# Patient Record
Sex: MALE | Race: WHITE | NOT HISPANIC OR LATINO | URBAN - METROPOLITAN AREA
[De-identification: names, ages, dates, MRNs, and addresses within clinical notes are randomized per-mention and may not be internally consistent; named-entity substitution may affect disease eponyms.]

---

## 2018-09-24 ENCOUNTER — EMERGENCY (EMERGENCY)
Facility: HOSPITAL | Age: 28
LOS: 1 days | Discharge: HOME | End: 2018-09-24
Attending: EMERGENCY MEDICINE
Payer: SELF-PAY

## 2018-09-24 VITALS
SYSTOLIC BLOOD PRESSURE: 148 MMHG | RESPIRATION RATE: 18 BRPM | HEART RATE: 57 BPM | DIASTOLIC BLOOD PRESSURE: 77 MMHG | OXYGEN SATURATION: 97 % | TEMPERATURE: 99 F

## 2018-09-24 VITALS — HEIGHT: 74 IN | WEIGHT: 240.08 LBS

## 2018-09-24 PROCEDURE — 72100 X-RAY EXAM L-S SPINE 2/3 VWS: CPT | Mod: 26

## 2018-09-24 PROCEDURE — 99283 EMERGENCY DEPT VISIT LOW MDM: CPT

## 2018-09-24 PROCEDURE — 72100 X-RAY EXAM L-S SPINE 2/3 VWS: CPT

## 2018-09-24 RX ORDER — METHOCARBAMOL 500 MG/1
1500 TABLET, FILM COATED ORAL ONCE
Qty: 0 | Refills: 0 | Status: COMPLETED | OUTPATIENT
Start: 2018-09-24 | End: 2018-09-24

## 2018-09-24 RX ORDER — LIDOCAINE 4 G/100G
1 CREAM TOPICAL ONCE
Qty: 0 | Refills: 0 | Status: COMPLETED | OUTPATIENT
Start: 2018-09-24 | End: 2018-09-24

## 2018-09-24 RX ORDER — KETOROLAC TROMETHAMINE 30 MG/ML
60 SYRINGE (ML) INJECTION ONCE
Qty: 0 | Refills: 0 | Status: DISCONTINUED | OUTPATIENT
Start: 2018-09-24 | End: 2018-09-24

## 2018-09-24 RX ORDER — METHOCARBAMOL 500 MG/1
2 TABLET, FILM COATED ORAL
Qty: 30 | Refills: 0
Start: 2018-09-24 | End: 2018-09-28

## 2018-09-24 RX ORDER — IBUPROFEN 200 MG
1 TABLET ORAL
Qty: 20 | Refills: 0
Start: 2018-09-24 | End: 2018-09-28

## 2018-09-24 RX ADMIN — METHOCARBAMOL 1500 MILLIGRAM(S): 500 TABLET, FILM COATED ORAL at 09:43

## 2018-09-24 RX ADMIN — LIDOCAINE 1 PATCH: 4 CREAM TOPICAL at 09:42

## 2018-09-24 NOTE — ED STATDOCS - OBJECTIVE STATEMENT
This is a 28 year old male with c/o back pain x 4 days.  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  He notes pain upon movement.  He has been taking IBU 600mg with some relief.  He denies any numbness or tingly sensation.  He notes prior back pain that he never got evaluated for.  He denies any bowel or bladder dysfunction.

## 2018-09-24 NOTE — ED ADULT NURSE NOTE - NSIMPLEMENTINTERV_GEN_ALL_ED
Implemented All Universal Safety Interventions:  Pitcher to call system. Call bell, personal items and telephone within reach. Instruct patient to call for assistance. Room bathroom lighting operational. Non-slip footwear when patient is off stretcher. Physically safe environment: no spills, clutter or unnecessary equipment. Stretcher in lowest position, wheels locked, appropriate side rails in place.

## 2018-09-24 NOTE — ED STATDOCS - MEDICAL DECISION MAKING DETAILS
back pain s/p lifting heavy object at work neurologically intact likely MSK spasm: XR, NSAIDS, robaxin, lidoderm patch, f/u with spine, rest, heat

## 2020-08-03 ENCOUNTER — TRANSCRIPTION ENCOUNTER (OUTPATIENT)
Age: 30
End: 2020-08-03

## 2021-01-07 PROBLEM — Z00.00 ENCOUNTER FOR PREVENTIVE HEALTH EXAMINATION: Status: ACTIVE | Noted: 2021-01-07

## 2021-01-12 ENCOUNTER — APPOINTMENT (OUTPATIENT)
Dept: INTERNAL MEDICINE | Facility: CLINIC | Age: 31
End: 2021-01-12
Payer: COMMERCIAL

## 2021-01-12 VITALS
WEIGHT: 268.25 LBS | OXYGEN SATURATION: 96 % | DIASTOLIC BLOOD PRESSURE: 87 MMHG | SYSTOLIC BLOOD PRESSURE: 135 MMHG | BODY MASS INDEX: 34.43 KG/M2 | TEMPERATURE: 97.7 F | HEIGHT: 74 IN | HEART RATE: 78 BPM

## 2021-01-12 DIAGNOSIS — R73.9 HYPERGLYCEMIA, UNSPECIFIED: ICD-10-CM

## 2021-01-12 DIAGNOSIS — E78.9 DISORDER OF LIPOPROTEIN METABOLISM, UNSPECIFIED: ICD-10-CM

## 2021-01-12 DIAGNOSIS — E03.9 HYPOTHYROIDISM, UNSPECIFIED: ICD-10-CM

## 2021-01-12 PROCEDURE — 99072 ADDL SUPL MATRL&STAF TM PHE: CPT

## 2021-01-12 PROCEDURE — 36415 COLL VENOUS BLD VENIPUNCTURE: CPT

## 2021-01-12 PROCEDURE — 99385 PREV VISIT NEW AGE 18-39: CPT | Mod: 25

## 2021-01-12 NOTE — HEALTH RISK ASSESSMENT
[Good] : ~his/her~  mood as  good [] : No [Yes] : Yes [Monthly or less (1 pt)] : Monthly or less (1 point) [1 or 2 (0 pts)] : 1 or 2 (0 points) [Never (0 pts)] : Never (0 points) [No] : In the past 12 months have you used drugs other than those required for medical reasons? No [No falls in past year] : Patient reported no falls in the past year [0] : 2) Feeling down, depressed, or hopeless: Not at all (0) [Audit-CScore] : 1 [UPD5Incgs] : 0 [Patient declined Low Dose CT Scan] : Patient declined Low Dose CT Scan [Patient declined Retinal Exam] : Patient declined Retinal Exam. [HIV Test offered] : HIV Test offered [Hepatitis C test offered] : Hepatitis C test offered

## 2021-01-12 NOTE — HISTORY OF PRESENT ILLNESS
[FreeTextEntry1] : np est [de-identified] : Mr. DAVE NOLAN is a 31 year male comes to the office for physical exam. Patient denies fever, cough SOB. No other complaints at this time.

## 2021-01-13 LAB
25(OH)D3 SERPL-MCNC: 20.4 NG/ML
ALBUMIN SERPL ELPH-MCNC: 4.7 G/DL
ALP BLD-CCNC: 44 U/L
ALT SERPL-CCNC: 43 U/L
ANION GAP SERPL CALC-SCNC: 14 MMOL/L
AST SERPL-CCNC: 27 U/L
BASOPHILS # BLD AUTO: 0.05 K/UL
BASOPHILS NFR BLD AUTO: 0.8 %
BILIRUB SERPL-MCNC: 0.4 MG/DL
BUN SERPL-MCNC: 21 MG/DL
CALCIUM SERPL-MCNC: 9.6 MG/DL
CHLORIDE SERPL-SCNC: 99 MMOL/L
CHOLEST SERPL-MCNC: 277 MG/DL
CO2 SERPL-SCNC: 23 MMOL/L
CREAT SERPL-MCNC: 1.19 MG/DL
EOSINOPHIL # BLD AUTO: 0.19 K/UL
EOSINOPHIL NFR BLD AUTO: 3.1 %
ESTIMATED AVERAGE GLUCOSE: 105 MG/DL
GLUCOSE SERPL-MCNC: 79 MG/DL
HBA1C MFR BLD HPLC: 5.3 %
HCT VFR BLD CALC: 43.7 %
HCV AB SER QL: NONREACTIVE
HCV S/CO RATIO: 0.06 S/CO
HDLC SERPL-MCNC: 47 MG/DL
HGB BLD-MCNC: 14 G/DL
HIV1+2 AB SPEC QL IA.RAPID: NONREACTIVE
IMM GRANULOCYTES NFR BLD AUTO: 0.3 %
LDLC SERPL CALC-MCNC: 196 MG/DL
LYMPHOCYTES # BLD AUTO: 2.24 K/UL
LYMPHOCYTES NFR BLD AUTO: 36.6 %
MAN DIFF?: NORMAL
MCHC RBC-ENTMCNC: 30.4 PG
MCHC RBC-ENTMCNC: 32 GM/DL
MCV RBC AUTO: 94.8 FL
MONOCYTES # BLD AUTO: 0.63 K/UL
MONOCYTES NFR BLD AUTO: 10.3 %
NEUTROPHILS # BLD AUTO: 2.99 K/UL
NEUTROPHILS NFR BLD AUTO: 48.9 %
NONHDLC SERPL-MCNC: 230 MG/DL
PLATELET # BLD AUTO: 264 K/UL
POTASSIUM SERPL-SCNC: 3.9 MMOL/L
PROT SERPL-MCNC: 7.2 G/DL
PSA SERPL-MCNC: 0.68 NG/ML
RBC # BLD: 4.61 M/UL
RBC # FLD: 14.2 %
SODIUM SERPL-SCNC: 136 MMOL/L
TRIGL SERPL-MCNC: 167 MG/DL
TSH SERPL-ACNC: 1.04 UIU/ML
WBC # FLD AUTO: 6.12 K/UL

## 2021-01-18 LAB
TESTOST BND SERPL-MCNC: 3.9 PG/ML
TESTOST SERPL-MCNC: 149.3 NG/DL

## 2021-05-25 ENCOUNTER — APPOINTMENT (OUTPATIENT)
Dept: INTERNAL MEDICINE | Facility: CLINIC | Age: 31
End: 2021-05-25
Payer: COMMERCIAL

## 2021-05-25 VITALS
WEIGHT: 241 LBS | HEART RATE: 57 BPM | DIASTOLIC BLOOD PRESSURE: 88 MMHG | SYSTOLIC BLOOD PRESSURE: 133 MMHG | TEMPERATURE: 97.1 F | HEIGHT: 74 IN | BODY MASS INDEX: 30.93 KG/M2

## 2021-05-25 DIAGNOSIS — B36.0 PITYRIASIS VERSICOLOR: ICD-10-CM

## 2021-05-25 PROCEDURE — 99214 OFFICE O/P EST MOD 30 MIN: CPT | Mod: 25

## 2021-05-25 PROCEDURE — 36415 COLL VENOUS BLD VENIPUNCTURE: CPT

## 2021-05-25 PROCEDURE — 99072 ADDL SUPL MATRL&STAF TM PHE: CPT

## 2021-05-25 NOTE — PHYSICAL EXAM
[No Acute Distress] : no acute distress [Well Nourished] : well nourished [Well Developed] : well developed [Well-Appearing] : well-appearing [Normal Sclera/Conjunctiva] : normal sclera/conjunctiva [PERRL] : pupils equal round and reactive to light [EOMI] : extraocular movements intact [Normal Outer Ear/Nose] : the outer ears and nose were normal in appearance [Normal Oropharynx] : the oropharynx was normal [No JVD] : no jugular venous distention [No Lymphadenopathy] : no lymphadenopathy [Supple] : supple [Thyroid Normal, No Nodules] : the thyroid was normal and there were no nodules present [No Respiratory Distress] : no respiratory distress  [No Accessory Muscle Use] : no accessory muscle use [Clear to Auscultation] : lungs were clear to auscultation bilaterally [Normal Rate] : normal rate  [Regular Rhythm] : with a regular rhythm [Normal S1, S2] : normal S1 and S2 [No Murmur] : no murmur heard [No Carotid Bruits] : no carotid bruits [No Abdominal Bruit] : a ~M bruit was not heard ~T in the abdomen [No Varicosities] : no varicosities [Pedal Pulses Present] : the pedal pulses are present [No Edema] : there was no peripheral edema [No Palpable Aorta] : no palpable aorta [No Extremity Clubbing/Cyanosis] : no extremity clubbing/cyanosis [Soft] : abdomen soft [Non Tender] : non-tender [Non-distended] : non-distended [No Masses] : no abdominal mass palpated [No HSM] : no HSM [Normal Bowel Sounds] : normal bowel sounds [Normal Posterior Cervical Nodes] : no posterior cervical lymphadenopathy [Normal Anterior Cervical Nodes] : no anterior cervical lymphadenopathy [No CVA Tenderness] : no CVA  tenderness [No Spinal Tenderness] : no spinal tenderness [No Joint Swelling] : no joint swelling [Grossly Normal Strength/Tone] : grossly normal strength/tone [Coordination Grossly Intact] : coordination grossly intact [No Focal Deficits] : no focal deficits [Normal Gait] : normal gait [Deep Tendon Reflexes (DTR)] : deep tendon reflexes were 2+ and symmetric [Normal Affect] : the affect was normal [Normal Insight/Judgement] : insight and judgment were intact [de-identified] : scattered many erthmatous macular lesions on chest abdomen and back.

## 2021-05-25 NOTE — PLAN
[FreeTextEntry1] : Tinea versicolor- prescribed topical antifungal/topical steroid.\par \par Regarding patient's obesity\par - encourage lifestyle modifications\par - diet and exercise\par - reduce calorie intake\par - no soda/ junk food/ snacks\par - consider mixed grains/ whole grains, leafy vegetables, and an appropriate serving of protein \par \par Prior to appointment and during encounter with patient extensive medical records were reviewed including but not limited to, Hospital records records, out patient records, laboratory data and microbiology data \par In addition extensive time was also spent in reviewing diagnostic studies.\par \par Total encounter total time 30 mins\par >50% of time spent counseling/coordinating care\par \par \par Counseling included abnormal lab results, differential diagnoses, treatment options, risks and benefits, lifestyle changes, current condition, medications, and dose adjustments. \par The patient was interactive, attentive, asked questions, and verbalized understanding

## 2021-05-25 NOTE — HISTORY OF PRESENT ILLNESS
[FreeTextEntry1] : check up [de-identified] : Mr. DAVE NOLAN is a 31 year male comes to the office c/o rash on upper body x 3 months, worse when sweating and during sun exposure. Patient denies fever, cough SOB. No other complaints at this time.

## 2021-05-26 LAB
ALBUMIN SERPL ELPH-MCNC: 4.7 G/DL
ALP BLD-CCNC: 42 U/L
ALT SERPL-CCNC: 15 U/L
ANION GAP SERPL CALC-SCNC: 14 MMOL/L
AST SERPL-CCNC: 18 U/L
BASOPHILS # BLD AUTO: 0.05 K/UL
BASOPHILS NFR BLD AUTO: 0.9 %
BILIRUB SERPL-MCNC: 0.4 MG/DL
BUN SERPL-MCNC: 19 MG/DL
CALCIUM SERPL-MCNC: 9.5 MG/DL
CHLORIDE SERPL-SCNC: 101 MMOL/L
CHOLEST SERPL-MCNC: 198 MG/DL
CO2 SERPL-SCNC: 25 MMOL/L
CREAT SERPL-MCNC: 1.09 MG/DL
EOSINOPHIL # BLD AUTO: 0.12 K/UL
EOSINOPHIL NFR BLD AUTO: 2.1 %
ESTIMATED AVERAGE GLUCOSE: 105 MG/DL
GLUCOSE SERPL-MCNC: 78 MG/DL
HBA1C MFR BLD HPLC: 5.3 %
HCT VFR BLD CALC: 40.7 %
HDLC SERPL-MCNC: 41 MG/DL
HGB BLD-MCNC: 13.2 G/DL
IMM GRANULOCYTES NFR BLD AUTO: 0.2 %
LDLC SERPL CALC-MCNC: 139 MG/DL
LYMPHOCYTES # BLD AUTO: 2.26 K/UL
LYMPHOCYTES NFR BLD AUTO: 38.7 %
MAN DIFF?: NORMAL
MCHC RBC-ENTMCNC: 29.8 PG
MCHC RBC-ENTMCNC: 32.4 GM/DL
MCV RBC AUTO: 91.9 FL
MONOCYTES # BLD AUTO: 0.54 K/UL
MONOCYTES NFR BLD AUTO: 9.2 %
NEUTROPHILS # BLD AUTO: 2.86 K/UL
NEUTROPHILS NFR BLD AUTO: 48.9 %
NONHDLC SERPL-MCNC: 157 MG/DL
PLATELET # BLD AUTO: 245 K/UL
POTASSIUM SERPL-SCNC: 4.5 MMOL/L
PROT SERPL-MCNC: 6.9 G/DL
RBC # BLD: 4.43 M/UL
RBC # FLD: 14.1 %
SODIUM SERPL-SCNC: 139 MMOL/L
TRIGL SERPL-MCNC: 89 MG/DL
WBC # FLD AUTO: 5.84 K/UL

## 2021-06-01 LAB
TESTOST BND SERPL-MCNC: 10.6 PG/ML
TESTOST SERPL-MCNC: 266.1 NG/DL

## 2021-08-17 NOTE — ED ADULT TRIAGE NOTE - ACCOMPANIED BY
Self RUE and RLE grossly at least 3/5, LUE grossly 3-/5, unable to accurately assess LLE 2/2 pt reported pain (appeared grossly at least 2-/5)/grossly assessed due to

## 2021-09-09 ENCOUNTER — APPOINTMENT (OUTPATIENT)
Dept: INTERNAL MEDICINE | Facility: CLINIC | Age: 31
End: 2021-09-09
Payer: COMMERCIAL

## 2021-09-09 DIAGNOSIS — R35.0 FREQUENCY OF MICTURITION: ICD-10-CM

## 2021-09-09 PROCEDURE — 99442: CPT

## 2021-09-10 NOTE — ED ADULT TRIAGE NOTE - SOURCE OF INFORMATION
Most upper respiratory infections are a viral infection, therefore antibiotics are not indicated. It can affect the nose, throat and upper air passages. typically referred to as \"the common cold\".  Symptoms can include dry or productive cough, nasal congestion and drainage, sore throat, and ear pain or feeling plugged.  These symptoms can last any where from 7-14 days.  Gargle with warm salt water if you have a sore throat  Mucinex-for productive/wet coughs  Delsym-for dry coughs  Increase oral fluid intake   Avoid dairy products because they also dry out secretions and cause mucus to thicken  Inhaled humidified air loosens/thins mucus (cool mist humidifier/sitting in bathroom with hot shower running)  Honey with lemon in tea (honey is safe for ages 2 and up. [place]uka or organic honey is best)  Practice good hand hygiene  Do not share utensils/cups  Patients with hypertension/high blood pressure should not use Sudafed/pseudoephedrine   May take Ibuprofen or Tylenol (if you do not have an allergy or contraindication) every 4-6 hours if needed for pain or high fever. (take with food)  Saline Nasal spray can be used  To reduce night cough, elevate head on 2-3 pillows reclining.  You have an illness that is caused by a virus and is not treatable by antibiotic.    Antibiotics are not indicated, symptoms may last 7-14 days.      If symptoms do not resolve or get worse go to urgent care.  If you have shortness of breath or chest pain go to the nearest emergency room or call 911.    
Patient
36.9

## 2021-09-11 ENCOUNTER — FORM ENCOUNTER (OUTPATIENT)
Age: 31
End: 2021-09-11

## 2021-09-12 ENCOUNTER — TRANSCRIPTION ENCOUNTER (OUTPATIENT)
Age: 31
End: 2021-09-12

## 2021-09-13 ENCOUNTER — OUTPATIENT (OUTPATIENT)
Dept: INPATIENT UNIT | Facility: HOSPITAL | Age: 31
LOS: 1 days | End: 2021-09-13
Payer: COMMERCIAL

## 2021-09-13 ENCOUNTER — APPOINTMENT (OUTPATIENT)
Dept: DISASTER EMERGENCY | Facility: HOSPITAL | Age: 31
End: 2021-09-13

## 2021-09-13 VITALS
SYSTOLIC BLOOD PRESSURE: 98 MMHG | HEART RATE: 83 BPM | DIASTOLIC BLOOD PRESSURE: 63 MMHG | OXYGEN SATURATION: 92 % | RESPIRATION RATE: 17 BRPM | TEMPERATURE: 101 F

## 2021-09-13 VITALS
OXYGEN SATURATION: 94 % | WEIGHT: 240.08 LBS | SYSTOLIC BLOOD PRESSURE: 110 MMHG | TEMPERATURE: 100 F | DIASTOLIC BLOOD PRESSURE: 72 MMHG | HEART RATE: 80 BPM | RESPIRATION RATE: 18 BRPM | HEIGHT: 72 IN

## 2021-09-13 DIAGNOSIS — U07.1 COVID-19: ICD-10-CM

## 2021-09-13 PROCEDURE — M0243: CPT

## 2021-09-13 RX ORDER — SODIUM CHLORIDE 9 MG/ML
250 INJECTION INTRAMUSCULAR; INTRAVENOUS; SUBCUTANEOUS
Refills: 0 | Status: COMPLETED | OUTPATIENT
Start: 2021-09-13 | End: 2021-09-13

## 2021-09-13 RX ADMIN — SODIUM CHLORIDE 310 MILLILITER(S): 9 INJECTION INTRAMUSCULAR; INTRAVENOUS; SUBCUTANEOUS at 14:17

## 2021-09-13 NOTE — CHART NOTE - NSCHARTNOTEFT_GEN_A_CORE
CC: Monoclonal Antibody Infusion/COVID 19 Positive      History: Patient presents for infusion of monoclonal antibody infusion. Patient has been screened and was deemed to be a candidate.    Exposure: several co workers COVID +    Symptoms/ Criteria: headche body ache fever chills cough  a  Inclusion Criteria:  BMI> 35    Date of Positive Test: verified by clinical call center     Date of symptom onset: 9/5    Vaccine status: unvaccinated    PMHx:  Infection due to severe acute respiratory syndrome coronavirus 2 (SARS-CoV-2)    No pertinent past medical history    No significant past surgical history          T(C): 39 (09-13-21 @ 14:15), Max: 39 (09-13-21 @ 14:15)  HR: 82 (09-13-21 @ 14:15) (80 - 82)  BP: 107/72 (09-13-21 @ 14:15) (107/72 - 110/72)  RR: 17 (09-13-21 @ 14:15) (17 - 18)  SpO2: 92% (09-13-21 @ 14:15) (92% - 94%)      PE:   Appearance: NAD	  HEENT:   Normal oral mucosa.   Lymphatic: No lymphadenopathy  Cardiovascular: Normal S1 S2, No JVD, No murmurs, No edema  Respiratory: Lungs clear to auscultation	  Gastrointestinal:  Soft, Non-tender. No guarding   Skin: warm and dry  Neurologic: Non-focal  Extremities: Normal range of motion.     ASSESSMENT:  Pt is a 31y year old Male without significant PMH  Covid +  referred to the infusion center for Monoclonal antibody infusion (Regeneron).        PLAN:  - infusion procedure explained to patient   - Consent for monoclonal antibody infusion obtained   - Risk & benefits discussed/all questions answered  - infuse Regeneron per protocol  - will observe patient for one hour post infusion  and then if stable discharge home with outpt follow up as planned by PMD.        - Patient tolerated infusion well denies complaints of chest pain/SOB/dizziness/ palpitations  - VSS for discharge home  - D/C instructions given/ fact sheet included.  - Patient to follow-up with PCP as needed.
I have reviewed the  Casirivimab/Imdevimab Emergency Use Authorization (EAU) and I have provided the patient or patient's caregiver with the following information:  1. FDA has authorized emergency use of Casirivimab/Imdevimab , which is not FDA-approved biologic product.  2. The patient or patient's caregiver has the option to accept or refuse administration of Casirivimab  3. The significant risks and benefits are unknown.  4. Information on available alternative treatments and risks and benefits of those alternatives.    Discharge:  T(C): 38.3 (09-13-21 @ 15:32), Max: 39 (09-13-21 @ 14:15)  HR: 83 (09-13-21 @ 15:32) (78 - 86)  BP: 98/63 (09-13-21 @ 15:32) (98/63 - 110/72)  RR: 17 (09-13-21 @ 15:32) (17 - 18)  SpO2: 92% (09-13-21 @ 15:32) (92% - 95%)  Patient tolerated infusion well denies complaints of chest pain/SOB/dizzines/ palps  VSS for discharge home  D/C instructions given/ fact sheet included.  Patient to follow-up with PCP as needed

## 2021-09-14 ENCOUNTER — INPATIENT (INPATIENT)
Facility: HOSPITAL | Age: 31
LOS: 3 days | Discharge: HOME | DRG: 177 | End: 2021-09-18
Attending: INTERNAL MEDICINE | Admitting: HOSPITALIST
Payer: COMMERCIAL

## 2021-09-14 VITALS — HEIGHT: 72 IN

## 2021-09-14 DIAGNOSIS — R09.89 OTHER SPECIFIED SYMPTOMS AND SIGNS INVOLVING THE CIRCULATORY AND RESPIRATORY SYSTEMS: ICD-10-CM

## 2021-09-14 DIAGNOSIS — U07.1 COVID-19: ICD-10-CM

## 2021-09-14 LAB
ALBUMIN SERPL ELPH-MCNC: 3.8 G/DL — SIGNIFICANT CHANGE UP (ref 3.3–5.2)
ALP SERPL-CCNC: 43 U/L — SIGNIFICANT CHANGE UP (ref 40–120)
ALT FLD-CCNC: 123 U/L — HIGH
ANION GAP SERPL CALC-SCNC: 15 MMOL/L — SIGNIFICANT CHANGE UP (ref 5–17)
ANISOCYTOSIS BLD QL: SIGNIFICANT CHANGE UP
AST SERPL-CCNC: 97 U/L — HIGH
BASOPHILS # BLD AUTO: 0 K/UL — SIGNIFICANT CHANGE UP (ref 0–0.2)
BASOPHILS NFR BLD AUTO: 0 % — SIGNIFICANT CHANGE UP (ref 0–2)
BILIRUB SERPL-MCNC: 0.3 MG/DL — LOW (ref 0.4–2)
BUN SERPL-MCNC: 9.2 MG/DL — SIGNIFICANT CHANGE UP (ref 8–20)
CALCIUM SERPL-MCNC: 9 MG/DL — SIGNIFICANT CHANGE UP (ref 8.6–10.2)
CHLORIDE SERPL-SCNC: 99 MMOL/L — SIGNIFICANT CHANGE UP (ref 98–107)
CO2 SERPL-SCNC: 24 MMOL/L — SIGNIFICANT CHANGE UP (ref 22–29)
CREAT SERPL-MCNC: 0.79 MG/DL — SIGNIFICANT CHANGE UP (ref 0.5–1.3)
CRP SERPL-MCNC: 46 MG/L — HIGH
EOSINOPHIL # BLD AUTO: 0 K/UL — SIGNIFICANT CHANGE UP (ref 0–0.5)
EOSINOPHIL NFR BLD AUTO: 0 % — SIGNIFICANT CHANGE UP (ref 0–6)
FERRITIN SERPL-MCNC: 1699 NG/ML — HIGH (ref 30–400)
GIANT PLATELETS BLD QL SMEAR: PRESENT — SIGNIFICANT CHANGE UP
GLUCOSE SERPL-MCNC: 105 MG/DL — HIGH (ref 70–99)
HCT VFR BLD CALC: 43.5 % — SIGNIFICANT CHANGE UP (ref 39–50)
HGB BLD-MCNC: 14 G/DL — SIGNIFICANT CHANGE UP (ref 13–17)
LYMPHOCYTES # BLD AUTO: 0.65 K/UL — LOW (ref 1–3.3)
LYMPHOCYTES # BLD AUTO: 9.5 % — LOW (ref 13–44)
MANUAL SMEAR VERIFICATION: SIGNIFICANT CHANGE UP
MCHC RBC-ENTMCNC: 29.2 PG — SIGNIFICANT CHANGE UP (ref 27–34)
MCHC RBC-ENTMCNC: 32.2 GM/DL — SIGNIFICANT CHANGE UP (ref 32–36)
MCV RBC AUTO: 90.6 FL — SIGNIFICANT CHANGE UP (ref 80–100)
MICROCYTES BLD QL: SIGNIFICANT CHANGE UP
MONOCYTES # BLD AUTO: 0.24 K/UL — SIGNIFICANT CHANGE UP (ref 0–0.9)
MONOCYTES NFR BLD AUTO: 3.5 % — SIGNIFICANT CHANGE UP (ref 2–14)
NEUTROPHILS # BLD AUTO: 5.67 K/UL — SIGNIFICANT CHANGE UP (ref 1.8–7.4)
NEUTROPHILS NFR BLD AUTO: 83.5 % — HIGH (ref 43–77)
OVALOCYTES BLD QL SMEAR: SLIGHT — SIGNIFICANT CHANGE UP
PLAT MORPH BLD: NORMAL — SIGNIFICANT CHANGE UP
PLATELET # BLD AUTO: 260 K/UL — SIGNIFICANT CHANGE UP (ref 150–400)
POIKILOCYTOSIS BLD QL AUTO: SLIGHT — SIGNIFICANT CHANGE UP
POLYCHROMASIA BLD QL SMEAR: SLIGHT — SIGNIFICANT CHANGE UP
POTASSIUM SERPL-MCNC: 4.9 MMOL/L — SIGNIFICANT CHANGE UP (ref 3.5–5.3)
POTASSIUM SERPL-SCNC: 4.9 MMOL/L — SIGNIFICANT CHANGE UP (ref 3.5–5.3)
PROCALCITONIN SERPL-MCNC: 0.08 NG/ML — SIGNIFICANT CHANGE UP (ref 0.02–0.1)
PROT SERPL-MCNC: 7.2 G/DL — SIGNIFICANT CHANGE UP (ref 6.6–8.7)
RBC # BLD: 4.8 M/UL — SIGNIFICANT CHANGE UP (ref 4.2–5.8)
RBC # FLD: 13.7 % — SIGNIFICANT CHANGE UP (ref 10.3–14.5)
RBC BLD AUTO: SIGNIFICANT CHANGE UP
SARS-COV-2 RNA SPEC QL NAA+PROBE: DETECTED
SODIUM SERPL-SCNC: 138 MMOL/L — SIGNIFICANT CHANGE UP (ref 135–145)
TROPONIN T SERPL-MCNC: <0.01 NG/ML — SIGNIFICANT CHANGE UP (ref 0–0.06)
VARIANT LYMPHS # BLD: 3.5 % — SIGNIFICANT CHANGE UP (ref 0–6)
WBC # BLD: 6.79 K/UL — SIGNIFICANT CHANGE UP (ref 3.8–10.5)
WBC # FLD AUTO: 6.79 K/UL — SIGNIFICANT CHANGE UP (ref 3.8–10.5)

## 2021-09-14 PROCEDURE — 71045 X-RAY EXAM CHEST 1 VIEW: CPT | Mod: 26

## 2021-09-14 PROCEDURE — 99223 1ST HOSP IP/OBS HIGH 75: CPT

## 2021-09-14 PROCEDURE — 99285 EMERGENCY DEPT VISIT HI MDM: CPT

## 2021-09-14 PROCEDURE — 71275 CT ANGIOGRAPHY CHEST: CPT | Mod: 26,MA

## 2021-09-14 RX ORDER — REMDESIVIR 5 MG/ML
INJECTION INTRAVENOUS
Refills: 0 | Status: DISCONTINUED | OUTPATIENT
Start: 2021-09-14 | End: 2021-09-18

## 2021-09-14 RX ORDER — DEXAMETHASONE 0.5 MG/5ML
6 ELIXIR ORAL ONCE
Refills: 0 | Status: COMPLETED | OUTPATIENT
Start: 2021-09-14 | End: 2021-09-14

## 2021-09-14 RX ORDER — ENOXAPARIN SODIUM 100 MG/ML
40 INJECTION SUBCUTANEOUS DAILY
Refills: 0 | Status: DISCONTINUED | OUTPATIENT
Start: 2021-09-14 | End: 2021-09-15

## 2021-09-14 RX ORDER — REMDESIVIR 5 MG/ML
200 INJECTION INTRAVENOUS EVERY 24 HOURS
Refills: 0 | Status: COMPLETED | OUTPATIENT
Start: 2021-09-14 | End: 2021-09-14

## 2021-09-14 RX ORDER — REMDESIVIR 5 MG/ML
100 INJECTION INTRAVENOUS EVERY 24 HOURS
Refills: 0 | Status: DISCONTINUED | OUTPATIENT
Start: 2021-09-15 | End: 2021-09-18

## 2021-09-14 RX ORDER — DEXAMETHASONE 0.5 MG/5ML
6 ELIXIR ORAL DAILY
Refills: 0 | Status: DISCONTINUED | OUTPATIENT
Start: 2021-09-14 | End: 2021-09-18

## 2021-09-14 RX ORDER — SODIUM CHLORIDE 9 MG/ML
1000 INJECTION, SOLUTION INTRAVENOUS ONCE
Refills: 0 | Status: COMPLETED | OUTPATIENT
Start: 2021-09-14 | End: 2021-09-14

## 2021-09-14 RX ADMIN — SODIUM CHLORIDE 1000 MILLILITER(S): 9 INJECTION, SOLUTION INTRAVENOUS at 17:49

## 2021-09-14 RX ADMIN — SODIUM CHLORIDE 1000 MILLILITER(S): 9 INJECTION, SOLUTION INTRAVENOUS at 17:05

## 2021-09-14 RX ADMIN — REMDESIVIR 500 MILLIGRAM(S): 5 INJECTION INTRAVENOUS at 23:40

## 2021-09-14 RX ADMIN — Medication 6 MILLIGRAM(S): at 17:05

## 2021-09-14 NOTE — H&P ADULT - NSHPREVIEWOFSYSTEMS_GEN_ALL_CORE
REVIEW OF SYSTEMS  General: denies weakness, malaise  Skin/Breast: no new rash  Ophthalmologic: no change in vision  ENMT: no dysphagia, throat pain or change in hearing  Respiratory and Thorax: no difficulty breathing or chest pain  Cardiovascular: no palpitations or PND, orthopnea  Gastrointestinal: no abdominal pain, normal bowel movement, no dark stools  Genitourinary: no difficulty urinating, no burning urination  Musculoskeletal: no myalgia/arthrlagia  Neurological: no weakness, numbness, change in gait  Psychiatric: no depression, anxiety  Hematology/Lymphatics: denies easy bruising or bleeding  Endocrine: no polyuria, polydipsia REVIEW OF SYSTEMS  General: denies weakness, malaise  Skin/Breast: no new rash  Ophthalmologic: no change in vision  ENMT: no dysphagia, throat pain or change in hearing  Respiratory and Thorax: +difficulty breathing on exertion; no chest pain  Cardiovascular: no palpitations or PND, orthopnea  Gastrointestinal: no abdominal pain, normal bowel movement, no dark stools  Genitourinary: no difficulty urinating, no burning urination  Musculoskeletal: no myalgia/arthrlagia  Neurological: no weakness, numbness, change in gait  Psychiatric: no depression, anxiety  Hematology/Lymphatics: denies easy bruising or bleeding  Endocrine: no polyuria, polydipsia

## 2021-09-14 NOTE — ED ADULT TRIAGE NOTE - NS ED TRIAGE AVPU SCALE
Patient Instructions by Parth Madden MD at 09/07/17 11:42 AM     Author:  Parth Madden MD Service:  (none) Author Type:  Physician     Filed:  09/07/17 11:44 AM Encounter Date:  9/7/2017 Status:  Signed     :  Parth Madden MD (Physician)            PATIENT INFORMATION    Sinus Infection (Sinusitis)    Definition   A sinus infection is a bacterial infection of one of the seven sinuses that normally drain into the nose.  Sinus congestion can occur without an infection if one of the sinus openings becomes blocked from a cold or hay fever.  As bacteria multiply within the sinuses, pain and pressure occur above the eyebrow, behind the eye, or over the cheekbone.  Other symptoms can include a profuse yellow nasal drainage, postnasal drip, a blocked nose, fever and bad breath.  Until recent years, we didn't recognize that a chronic cough can be caused by a sinus infection.  Swallowing sinus secretions is normal and harmless but may lead to some nausea.  Most sinus infections can be diagnosed without sinus x-ray studies.  The following treatment should reduce pain and fever within 48 hours or less.    Home Treatment    Antibiotics This medicine will kill bacteria that are causing the sinus infection.  Try not to forget any of the doses.  If your child goes to school or to a , arrange for someone to give the afternoon dose.  If the medicine is a liquid, use a measuring spoon so you can give the right amount.  Also, an antibiotic should not be saved from one illness to the next because it loses its strength.  Even though your child will feel better in a few days, give all the medicine to prevent the infection from flaring up.    Nasal Washes Use warm water or saline nose drops followed by suction or nose blowing to wash dried mucus or pus out of the nose.  Do nasal washes at least four times a day or whenever your child can't breath through the nose.  If the air in your home is dry, run a  humidifier.    Pain Relief Medication Acetaminophen or ibuprofen can be given for a few days for sinus pain or any fever over 102 F.    Oral Antihistamine If your child also has hay fever, give her allergy medicine.  Otherwise, avoid antihistamines because they can slow down the movement of secretions out of the sinuses.    Contagiousness Sinus infections are not contagious.  Your child can return to school or day care when she is feeling better and the fever is gone.      Call our office Immediately if  · Redness or swelling occurs on the cheek, eyelid, or forehead.  · Your child starts acting very sick.    Call our office within 24 hours if  · The fever or pain is not gone after your child has taken the antibiotic for 48 hours.  · You have other questions or concerns.    Taken from: LINDA Aranda (1999).  Instructions for Pediatric Patients 2nd Edition.      Follow-Up  - If your symptoms worsen, please call.    Additional Educational Resources:  For additional resources regarding your symptoms, diagnosis, or further health information, please visit the Health Resources section on [email or web address] or the Online Health Resources section in [place].        Revision History        User Key Date/Time User Provider Type Action    > [N/A] 09/07/17 11:44 AM Parth Madden MD Physician Sign             Alert-The patient is alert, awake and responds to voice. The patient is oriented to time, place, and person. The triage nurse is able to obtain subjective information.

## 2021-09-14 NOTE — ED PROVIDER NOTE - PROGRESS NOTE DETAILS
labs and CXR reviewed +PNA, pending CTA, spoke to CT tech patient is next, care signed out to GRANT Holguin reviewed ct results will admit to medicine for covid

## 2021-09-14 NOTE — ED ADULT TRIAGE NOTE - CHIEF COMPLAINT QUOTE
Pt states current COVID infection and having SOB and wife who is RN states pulse ox at home reading 88-90%.

## 2021-09-14 NOTE — ED PROVIDER NOTE - OBJECTIVE STATEMENT
This is a 31 year old male here for SOB and difficulty breathing x 12 days.  He was diagnosed with covid x 12 days ago at .  He reports wife is RN and has been monitoring his pulse ox and when he does a flight of steps pulse ox drops to 88%.  He notes is feeling winded with fatigue.  He reports has been eating less and lost weight 5lbs.  He reports PCP started him on dexamethasone today.  He reports received covid antibody infusion yesterday.  He notes no diarhea, recent travel or rashes.  Patient is not vaccinated for covid.

## 2021-09-14 NOTE — H&P ADULT - ASSESSMENT
30 yo male with HLD who presents from home with SOB after being diagnosed with COVID-19 12 days ago. Now admitted with exertional hypoxia from COVID 19    #covid 19 pneumonia  - admit to medicine  -  bed  - dexamethasone IV daily  - remdesivir daily  - CMP in the AM  - ID consult as indicated but not at this time  - lovenox daily    #HLD  - intermittently takes atorvastatin (can't remember dose)  - hasn't taken in a while  - check lipid panel in the AM    #DVT ppx  - as above

## 2021-09-14 NOTE — ED PROVIDER NOTE - NS ED ROS FT
Procedure Date: 12/15/2020      REASONS FOR ADMISSION:  Symptomatic cystocele and rectocele.      PROCEDURES PERFORMED:  Anterior and posterior repair of the vagina with perineoplasty.      POSTOPERATIVE DIAGNOSES:  Symptomatic cystocele and rectocele.         DESCRIPTION OF PROCEDURE:  After general anesthesia was induced, the patient was placed in the dorsal lithotomy position and prepped and draped in the usual fashion.  A Galaviz catheter was placed.  Lidocaine was injected into the anterior vaginal wall from the posterior urethrovesical angle to the apex.  A midline incision was made to the apex and mucosa and fascia dissected free from a large cystocele.  Maria L plication stitches of 2-0 chromic were placed at the angle.  The excess vaginal mucosa and fascia were excised and the defect closed with interrupted 2-0 Vicryl stitches to the apex.      The posterior vaginal wall was injected and a large rectocele identified.  A perineoplasty bob was excised at the introitus.  A midline incision was carried superiorly and a large rectocele dissected free.  Plication stitches of 2-0 chromic brought the levators to the midline.  The defect was closed from the apex all the way to the perineoplasty stitches at the level of the hymenal ring.  Hemostasis was excellent.  A vag pack was placed.  Urine output for the case was 100 mL.  Blood loss was less than 20 mL.  The patient was given Toradol and taken to the recovery room.  A vaginal pack was placed and will be removed in the morning.  We anticipate a 24-hour stay.         RONNIE RENTERIA JR, MD             D: 12/15/2020   T: 12/15/2020   MT: KALYN      Name:     ALEN CHILDS   MRN:      [id number]        Account:        [id number]   :      1972           Procedure Date: 12/15/2020      Document: [id number]     No fever/chills, No photophobia/eye pain/changes in vision, No ear pain/sore throat/dysphagia, No chest pain/palpitations, +SOB/+cough/wheeze/stridor, No abdominal pain, No N/V/D, no dysuria/frequency/discharge, No neck/back pain, no rash, no changes in neurological status/function.

## 2021-09-14 NOTE — H&P ADULT - HISTORY OF PRESENT ILLNESS
32 yo male with HLD who presents from home with SOB after being diagnosed with COVID-19 12 days ago. Patient received monoclonal antibodies yesterday. Was supposed to start on dexamethasone but came here. Patient denies chest pain, leg pain/swelling. Endorses some fever/chills that have largely passed. Patient has not received vaccine. Patient reports at home he had O2 sat as low as 89%. Reportedly in the ED he had oxygen saturation in the low 80's. Medicine was called for admission. At this time patient feels comfortable on room air, he admits to exertional SOB.

## 2021-09-14 NOTE — ED PROVIDER NOTE - ATTENDING CONTRIBUTION TO CARE
31 yom here for SOB and difficulty breathing x 12 days.  He was diagnosed with covid x 12 days ago at .  Not vaccinated. Feeling winded and fatigued a/w chest pain and sob. Wife was monitoring his pulse ox, after walking down stairs his pulse ox dropped to 88%.  He reports received covid antibody infusion yesterday.

## 2021-09-14 NOTE — H&P ADULT - NSHPSOCIALHISTORY_GEN_ALL_CORE
No cigarette, alcohol or illicit drug use. , lives with family No cigarette, drinks alcohol rarely, marijuana occasionally. , lives with family

## 2021-09-14 NOTE — H&P ADULT - NSHPPHYSICALEXAM_GEN_ALL_CORE
Vital Signs Last 24 Hrs  T(C): 36.9 (14 Sep 2021 17:13), Max: 36.9 (14 Sep 2021 17:13)  T(F): 98.4 (14 Sep 2021 17:13), Max: 98.4 (14 Sep 2021 17:13)  HR: 72 (14 Sep 2021 17:13) (72 - 72)  BP: 120/74 (14 Sep 2021 17:13) (120/74 - 120/74)  BP(mean): --  RR: 16 (14 Sep 2021 17:13) (16 - 16)  SpO2: 94% (14 Sep 2021 17:13) (94% - 94%)

## 2021-09-15 DIAGNOSIS — U07.1 COVID-19: ICD-10-CM

## 2021-09-15 DIAGNOSIS — Z02.9 ENCOUNTER FOR ADMINISTRATIVE EXAMINATIONS, UNSPECIFIED: ICD-10-CM

## 2021-09-15 DIAGNOSIS — E78.5 HYPERLIPIDEMIA, UNSPECIFIED: ICD-10-CM

## 2021-09-15 LAB
ALBUMIN SERPL ELPH-MCNC: 3.6 G/DL — SIGNIFICANT CHANGE UP (ref 3.3–5.2)
ALP SERPL-CCNC: 51 U/L — SIGNIFICANT CHANGE UP (ref 40–120)
ALT FLD-CCNC: 222 U/L — HIGH
ANION GAP SERPL CALC-SCNC: 12 MMOL/L — SIGNIFICANT CHANGE UP (ref 5–17)
AST SERPL-CCNC: 181 U/L — HIGH
BILIRUB SERPL-MCNC: <0.2 MG/DL — LOW (ref 0.4–2)
BUN SERPL-MCNC: 14.6 MG/DL — SIGNIFICANT CHANGE UP (ref 8–20)
CALCIUM SERPL-MCNC: 9.1 MG/DL — SIGNIFICANT CHANGE UP (ref 8.6–10.2)
CHLORIDE SERPL-SCNC: 102 MMOL/L — SIGNIFICANT CHANGE UP (ref 98–107)
CHOLEST SERPL-MCNC: 179 MG/DL — SIGNIFICANT CHANGE UP
CO2 SERPL-SCNC: 24 MMOL/L — SIGNIFICANT CHANGE UP (ref 22–29)
CREAT SERPL-MCNC: 0.73 MG/DL — SIGNIFICANT CHANGE UP (ref 0.5–1.3)
GLUCOSE SERPL-MCNC: 141 MG/DL — HIGH (ref 70–99)
HCT VFR BLD CALC: 40.7 % — SIGNIFICANT CHANGE UP (ref 39–50)
HDLC SERPL-MCNC: 31 MG/DL — LOW
HGB BLD-MCNC: 13.3 G/DL — SIGNIFICANT CHANGE UP (ref 13–17)
LIPID PNL WITH DIRECT LDL SERPL: 118 MG/DL — HIGH
MCHC RBC-ENTMCNC: 29.3 PG — SIGNIFICANT CHANGE UP (ref 27–34)
MCHC RBC-ENTMCNC: 32.7 GM/DL — SIGNIFICANT CHANGE UP (ref 32–36)
MCV RBC AUTO: 89.6 FL — SIGNIFICANT CHANGE UP (ref 80–100)
NON HDL CHOLESTEROL: 148 MG/DL — HIGH
PLATELET # BLD AUTO: 301 K/UL — SIGNIFICANT CHANGE UP (ref 150–400)
POTASSIUM SERPL-MCNC: 4.7 MMOL/L — SIGNIFICANT CHANGE UP (ref 3.5–5.3)
POTASSIUM SERPL-SCNC: 4.7 MMOL/L — SIGNIFICANT CHANGE UP (ref 3.5–5.3)
PROT SERPL-MCNC: 6.9 G/DL — SIGNIFICANT CHANGE UP (ref 6.6–8.7)
RBC # BLD: 4.54 M/UL — SIGNIFICANT CHANGE UP (ref 4.2–5.8)
RBC # FLD: 13.7 % — SIGNIFICANT CHANGE UP (ref 10.3–14.5)
SODIUM SERPL-SCNC: 138 MMOL/L — SIGNIFICANT CHANGE UP (ref 135–145)
TRIGL SERPL-MCNC: 152 MG/DL — HIGH
WBC # BLD: 3.98 K/UL — SIGNIFICANT CHANGE UP (ref 3.8–10.5)
WBC # FLD AUTO: 3.98 K/UL — SIGNIFICANT CHANGE UP (ref 3.8–10.5)

## 2021-09-15 PROCEDURE — 99222 1ST HOSP IP/OBS MODERATE 55: CPT

## 2021-09-15 PROCEDURE — 99233 SBSQ HOSP IP/OBS HIGH 50: CPT

## 2021-09-15 RX ORDER — ENOXAPARIN SODIUM 100 MG/ML
40 INJECTION SUBCUTANEOUS DAILY
Refills: 0 | Status: DISCONTINUED | OUTPATIENT
Start: 2021-09-15 | End: 2021-09-18

## 2021-09-15 RX ORDER — ATORVASTATIN CALCIUM 80 MG/1
10 TABLET, FILM COATED ORAL AT BEDTIME
Refills: 0 | Status: DISCONTINUED | OUTPATIENT
Start: 2021-09-15 | End: 2021-09-18

## 2021-09-15 RX ORDER — ACETAMINOPHEN 500 MG
650 TABLET ORAL EVERY 6 HOURS
Refills: 0 | Status: DISCONTINUED | OUTPATIENT
Start: 2021-09-15 | End: 2021-09-18

## 2021-09-15 RX ORDER — SODIUM CHLORIDE 9 MG/ML
1000 INJECTION INTRAMUSCULAR; INTRAVENOUS; SUBCUTANEOUS
Refills: 0 | Status: DISCONTINUED | OUTPATIENT
Start: 2021-09-15 | End: 2021-09-18

## 2021-09-15 RX ORDER — INFLUENZA VIRUS VACCINE 15; 15; 15; 15 UG/.5ML; UG/.5ML; UG/.5ML; UG/.5ML
0.5 SUSPENSION INTRAMUSCULAR ONCE
Refills: 0 | Status: DISCONTINUED | OUTPATIENT
Start: 2021-09-15 | End: 2021-09-18

## 2021-09-15 RX ORDER — ENOXAPARIN SODIUM 100 MG/ML
40 INJECTION SUBCUTANEOUS
Refills: 0 | Status: DISCONTINUED | OUTPATIENT
Start: 2021-09-15 | End: 2021-09-15

## 2021-09-15 RX ADMIN — Medication 650 MILLIGRAM(S): at 11:11

## 2021-09-15 RX ADMIN — Medication 650 MILLIGRAM(S): at 12:03

## 2021-09-15 RX ADMIN — ENOXAPARIN SODIUM 40 MILLIGRAM(S): 100 INJECTION SUBCUTANEOUS at 21:52

## 2021-09-15 RX ADMIN — SODIUM CHLORIDE 75 MILLILITER(S): 9 INJECTION INTRAMUSCULAR; INTRAVENOUS; SUBCUTANEOUS at 17:06

## 2021-09-15 RX ADMIN — Medication 6 MILLIGRAM(S): at 05:04

## 2021-09-15 RX ADMIN — Medication 650 MILLIGRAM(S): at 18:39

## 2021-09-15 RX ADMIN — REMDESIVIR 500 MILLIGRAM(S): 5 INJECTION INTRAVENOUS at 23:26

## 2021-09-15 RX ADMIN — Medication 650 MILLIGRAM(S): at 23:25

## 2021-09-15 RX ADMIN — Medication 650 MILLIGRAM(S): at 17:39

## 2021-09-15 RX ADMIN — ATORVASTATIN CALCIUM 10 MILLIGRAM(S): 80 TABLET, FILM COATED ORAL at 21:49

## 2021-09-15 NOTE — PROGRESS NOTE ADULT - PROBLEM SELECTOR PLAN 3
- DVT ppx- Lovenox 40 BID  - Diet- Regular diet  - Dispo- pending clinical improvement. Likely home.     - Updated patient's wife, Supriya on 9/15    Patient is actively acute, no plan for discharge at this time pending clinical course. Patient is at high risk at the moment due to COVID19 requiring NC. - DVT ppx- Lovenox 40 QD  - Diet- Regular diet  - Dispo- pending clinical improvement. Likely home.     - Updated patient's wife, Supriya on 9/15    Patient is actively acute, no plan for discharge at this time pending clinical course. Patient is at high risk at the moment due to COVID19 requiring NC.

## 2021-09-15 NOTE — CONSULT NOTE ADULT - SUBJECTIVE AND OBJECTIVE BOX
[facility]  INFECTIOUS DISEASES  at [place]  =======================================================  Osbaldo Tong MD    Diplomates American Board of Internal Medicine and Infectious Diseases  Tel  [phone]  Fax [phone]  =======================================================    [facility]-[id number]  [address]   HPI: This 32 yo male with CURRENT MEDICAL CONDITIONS of HLD who presents from home with SOB after being diagnosed with COVID-19 12 days ago. Patient received monoclonal antibodies yesterday. Was supposed to start on dexamethasone but came here. Patient denies chest pain, leg pain/swelling. Endorses some fever/chills that have largely passed. Patient has not received vaccine. Patient reports at home he had O2 sat as low as 89%. Reportedly in the ED he had oxygen saturation in the low 80's. Medicine was called for admission. At this time patient feels comfortable on room air, he admits to exertional SOB. (14 Sep 2021 22:36)    he reports that he started feeling sick around labor day. 9/6/21.  several days prior to coming to hospital he became increasing SOB. went to the pharmacy and bought  a pulse ox monitor.  found that it was low.   patient still has SOB. On oxygen here.   COVID testing is confirmed positive here.   CT of the chest showed:   1. Pattern of GGO suggests infection including atypical pneumonia/viral infection from atypical agents including COVID-19 (C19V-1).  2. No acute pulmonary embolus.    he was started on remdesivir and steroids here.         I have personally reviewed the labs and data; pertinent labs and data are listed in this note; please see below.   =======================================================  Past Medical & Surgical Hx:  =====================  PAST MEDICAL & SURGICAL HISTORY:  No pertinent past medical history    No significant past surgical history      Problem List:  ==========  HEALTH ISSUES - PROBLEM Dx:  2019 novel coronavirus disease (COVID-19)    Hyperlipidemia    Discharge planning issues          Social Hx:  =======  no toxic habits currently    FAMILY HISTORY:  No pertinent family history in first degree relatives    no significant family history of immunosuppressive disorders in mother or father   =======================================================    REVIEW OF SYSTEMS:  CONSTITUTIONAL:  as per HPI  HEENT:  No diplopia or blurred vision.  No earache, sore throat or runny nose.  CARDIOVASCULAR:  No pressure, squeezing, strangling, tightness, heaviness or aching about the chest, neck, axilla or epigastrium.  RESPIRATORY:  as per HPI  GASTROINTESTINAL:  No nausea, vomiting or diarrhea.  GENITOURINARY:  No dysuria, frequency or urgency. No Blood in urine  MUSCULOSKELETAL:  no joint aches, no muscle pain  SKIN:  No change in skin, hair or nails.  NEUROLOGIC:  No Headaches, seizures or weakness.  PSYCHIATRIC:  No disorder of thought or mood.  ENDOCRINE:  No heat or cold intolerance  HEMATOLOGICAL:  No easy bruising or bleeding.    =======================================================  Allergies    No Known Allergies    Intolerances    Antibiotics:  remdesivir  IVPB   IV Intermittent   remdesivir  IVPB 100 milliGRAM(s) IV Intermittent every 24 hours    Other medications:  atorvastatin 10 milliGRAM(s) Oral at bedtime  dexAMETHasone  Injectable 6 milliGRAM(s) IV Push daily  enoxaparin Injectable 40 milliGRAM(s) SubCutaneous daily  influenza   Vaccine 0.5 milliLiter(s) IntraMuscular once  sodium chloride 0.9%. 1000 milliLiter(s) IV Continuous <Continuous>     remdesivir  IVPB   500 mL/Hr IV Intermittent (09-14-21 @ 23:40)      ======================================================  Physical Exam:  ============  T(F): 98.7 (15 Sep 2021 17:17), Max: 98.7 (15 Sep 2021 17:17)  HR: 82 (15 Sep 2021 17:17)  BP: 129/78 (15 Sep 2021 17:17)  RR: 18 (15 Sep 2021 17:17)  SpO2: 100% (15 Sep 2021 17:17) (87% - 100%)  temp max in last 48H T(F): , Max: 98.7 (09-15-21 @ 17:17)Height (cm): 188 (09-15-21 @ 17:17)  Weight (kg): 106.6 (09-15-21 @ 17:17)  BMI (kg/m2): 30.2 (09-15-21 @ 17:17)  BSA (m2): 2.33 (09-15-21 @ 17:17)    General:  No acute distress.  Eye: Pupils are equal, round and reactive to light, Extraocular movements are intact, Normal conjunctiva.  HENT: Normocephalic, Oral mucosa is moist, No pharyngeal erythema, No sinus tenderness.  ON SUPPLEMENTAL OXYGEN  Neck: Supple, No lymphadenopathy.  Respiratory: Lungs  with COARSE breath sounds at bases  Cardiovascular: Normal rate, Regular rhythm,   Gastrointestinal: Soft, Non-tender, Non-distended, Normal bowel sounds.  Genitourinary: No costovertebral angle tenderness.  Lymphatics: No lymphadenopathy neck,   Musculoskeletal: Normal range of motion, Normal strength.  Integumentary: No rash.  Neurologic: Alert, Oriented, No focal deficits, Cranial Nerves II-XII are grossly intact.  Psychiatric: Appropriate mood & affect.    =======================================================  Labs:                        13.3   3.98  )-----------( 301      ( 15 Sep 2021 09:37 )             40.7     09-15    138  |  102  |  14.6  ----------------------------<  141<H>  4.7   |  24.0  |  0.73    Ca    9.1      15 Sep 2021 09:37    TPro  6.9  /  Alb  3.6  /  TBili  <0.2<L>  /  DBili  x   /  AST  181<H>  /  ALT  222<H>  /  AlkPhos  51  09-15      Creatinine, Serum: 0.73 mg/dL (09-15-21 @ 09:37)  Creatinine, Serum: 0.79 mg/dL (09-14-21 @ 17:11)    C-Reactive Protein, Serum: 46 mg/L (09-14-21 @ 17:11)      Procalcitonin, Serum: 0.08 ng/mL (09-14-21 @ 17:11)      COVID-19 PCR: Detected (09-14-21 @ 17:11)       < from: CT Angio Chest PE Protocol w/ IV Cont (09.14.21 @ 21:58) >     EXAM:  CT ANGIO CHEST PULM [facility]                          PROCEDURE DATE:  09/14/2021          INTERPRETATION:  CLINICAL INFORMATION: Shortness of breath, hypoxic, history of Covid    COMPARISON: None.    CONTRAST/COMPLICATIONS:  IV Contrast: Omnipaque 350  72 cc administered   28 cc discarded  Oral Contrast: NONE  Complications: None reported at time of study completion    PROCEDURE:  CT Angiography of the Chest.  Sagittal and coronal reformats were performed as well as 3D (MIP) reconstructions.    FINDINGS:    LUNGS AND AIRWAYS: Groundglass infiltrates with peripheral and basilar predominance. Central airways are clear.  PLEURA: No pleural effusion.  MEDIASTINUM AND TOMA: No lymphadenopathy.  VESSELS: Pulmonary arteries are opacifiedto the segmental level. No acute thromboembolus. Main pulmonary artery is normal in caliber.  Thoracic aorta normal in caliber without dissection.  HEART: Heart size is normal. No pericardial effusion.  CHEST WALL AND LOWER NECK: Within normal limits.  VISUALIZED UPPER ABDOMEN: Within normal limits.  BONES: No acute osseous abnormality    IMPRESSION:    1. Pattern of GGO suggests infection including atypical pneumonia/viral infection from atypical agents including COVID-19 (C19V-1).  2. No acute pulmonary embolus.        --- End of Report ---            ALAN LINCOLN MD; Attending Radiologist  This document has been electronically signed. Sep 14 2021 10:23PM    < end of copied text >

## 2021-09-15 NOTE — PATIENT PROFILE ADULT - NSPROGENPREVTRANSF_GEN_A_NUR
"/80 (BP Location: Right arm)   Pulse 74   Temp (!) 95.7  F (35.4  C) (Oral)   Resp 16   Ht 1.486 m (4' 10.5\")   Wt 42.7 kg (94 lb 2.2 oz)   SpO2 95%   BMI 19.34 kg/m     AVSS.    Neuro: Alert and oriented x3. Confused to time. Pleasant.     GI/: Abdomen flat and soft. BM x1. Pure wick in place.     Diet: Regular diet with good intake. No c/o nausea.     Incisions/Drains: Pure wick.     IV Access: PIV - sl.     Labs: Results as of 9/12/2021 14:48   Ref. Range 9/12/2021 08:18   WBC Latest Ref Range: 4.0 - 11.0 10e3/uL 8.0   Hemoglobin Latest Ref Range: 11.7 - 15.7 g/dL 12.9       Activity: OOB with PT. WBAT. CMS to all extremities intact.     Pain: No pain at rest. Right inner groin pain with movement. Lido patch in place. On scheduled Robaxin and tylenol. Declined PRN narcotics.     New changes this shift: No changes.     Plan: Continue with current POC.    " no history of blood product transfusion

## 2021-09-15 NOTE — CONSULT NOTE ADULT - ASSESSMENT
This 30 yo male with CURRENT MEDICAL CONDITIONS of HLD who presents from home with SOB after being diagnosed with COVID-19 12 days ago. Patient received monoclonal antibodies yesterday. Was supposed to start on dexamethasone but came here. Patient denies chest pain, leg pain/swelling. Endorses some fever/chills that have largely passed. Patient has not received vaccine. Patient reports at home he had O2 sat as low as 89%. Reportedly in the ED he had oxygen saturation in the low 80's. Medicine was called for admission. At this time patient feels comfortable on room air, he admits to exertional SOB. (14 Sep 2021 22:36)    he reports that he started feeling sick around labor day. 9/6/21.  several days prior to coming to hospital he became increasing SOB. went to the pharmacy and bought  a pulse ox monitor.  found that it was low.   patient still has SOB. On oxygen here.   COVID testing is confirmed positive here.   CT of the chest showed:   1. Pattern of GGO suggests infection including atypical pneumonia/viral infection from atypical agents including COVID-19 (C19V-1).  2. No acute pulmonary embolus.    he was started on remdesivir and steroids here.     Impression:  COVID-19 infection   Viral pneumonia  shortness of breath  lung infiltrates  dependence on oxygen    Plan:  - continue Remdesivir IV daily.  will plan for a 5 day course.   - MAY stop earlier than 5 days, and send home, if patient is back on Ambient oxygen/ room air.  - alternatively MAY need a 10 day course if little improvement during the 5 day course.      Patient have received Monoclonal AB (Regeneron-Cov) as outpatient.   will need to clarify on the date      - continue dexamethasone 6mg daily, While on remdesivir  trend Inflammatory markers and LFTs   - trend CBC with diff, CMP  daily    - Continue supportive care measures  - continue Oxygenation as needed;  CONTINUE to titrate down as tolerated  - self- proning  as tolerated  - ENCOURAGED OOB to chair  - encouraged incentive spirometry       Will follow with you.

## 2021-09-15 NOTE — PROGRESS NOTE ADULT - PROBLEM SELECTOR PLAN 1
- Found to have COVID19 (9/15). Will c/w NC 4L, wean as tolerated  - C/w Remdesivir and decadron  - Will monitor LFTs. If continues to uptrend, will obtain US abdomen and d/c Remdesivir - Found to have COVID19 (9/15). Will c/w NC 4L, wean as tolerated  - C/w Remdesivir and decadron  - Will monitor LFTs. If continues to uptrend, will obtain US abdomen and d/c Remdesivir  - ID c/s placed (Dr. Fuchs) - Found to have COVID19 (9/15). Will c/w NC 4L, wean as tolerated  - C/w Remdesivir and decadron  - Will monitor LFTs. If continues to uptrend (ALT> 400), will obtain US abdomen and d/c Remdesivir  - ID c/s placed (Dr. Fuchs)

## 2021-09-16 LAB
ALBUMIN SERPL ELPH-MCNC: 3.4 G/DL — SIGNIFICANT CHANGE UP (ref 3.3–5.2)
ALP SERPL-CCNC: 44 U/L — SIGNIFICANT CHANGE UP (ref 40–120)
ALT FLD-CCNC: 238 U/L — HIGH
ANION GAP SERPL CALC-SCNC: 12 MMOL/L — SIGNIFICANT CHANGE UP (ref 5–17)
AST SERPL-CCNC: 115 U/L — HIGH
BILIRUB DIRECT SERPL-MCNC: 0.1 MG/DL — SIGNIFICANT CHANGE UP (ref 0–0.3)
BILIRUB INDIRECT FLD-MCNC: SIGNIFICANT CHANGE UP MG/DL (ref 0.2–1)
BILIRUB SERPL-MCNC: <0.2 MG/DL — LOW (ref 0.4–2)
BUN SERPL-MCNC: 17.6 MG/DL — SIGNIFICANT CHANGE UP (ref 8–20)
CALCIUM SERPL-MCNC: 8.4 MG/DL — LOW (ref 8.6–10.2)
CHLORIDE SERPL-SCNC: 105 MMOL/L — SIGNIFICANT CHANGE UP (ref 98–107)
CO2 SERPL-SCNC: 23 MMOL/L — SIGNIFICANT CHANGE UP (ref 22–29)
COVID-19 SPIKE DOMAIN AB INTERP: POSITIVE
COVID-19 SPIKE DOMAIN ANTIBODY RESULT: >250 U/ML — HIGH
CREAT SERPL-MCNC: 0.78 MG/DL — SIGNIFICANT CHANGE UP (ref 0.5–1.3)
GLUCOSE SERPL-MCNC: 123 MG/DL — HIGH (ref 70–99)
HCT VFR BLD CALC: 38.8 % — LOW (ref 39–50)
HGB BLD-MCNC: 12.5 G/DL — LOW (ref 13–17)
MCHC RBC-ENTMCNC: 29.1 PG — SIGNIFICANT CHANGE UP (ref 27–34)
MCHC RBC-ENTMCNC: 32.2 GM/DL — SIGNIFICANT CHANGE UP (ref 32–36)
MCV RBC AUTO: 90.4 FL — SIGNIFICANT CHANGE UP (ref 80–100)
PLATELET # BLD AUTO: 323 K/UL — SIGNIFICANT CHANGE UP (ref 150–400)
POTASSIUM SERPL-MCNC: 4.4 MMOL/L — SIGNIFICANT CHANGE UP (ref 3.5–5.3)
POTASSIUM SERPL-SCNC: 4.4 MMOL/L — SIGNIFICANT CHANGE UP (ref 3.5–5.3)
PROT SERPL-MCNC: 6.3 G/DL — LOW (ref 6.6–8.7)
RBC # BLD: 4.29 M/UL — SIGNIFICANT CHANGE UP (ref 4.2–5.8)
RBC # FLD: 14.1 % — SIGNIFICANT CHANGE UP (ref 10.3–14.5)
SARS-COV-2 IGG+IGM SERPL QL IA: >250 U/ML — HIGH
SARS-COV-2 IGG+IGM SERPL QL IA: POSITIVE
SODIUM SERPL-SCNC: 140 MMOL/L — SIGNIFICANT CHANGE UP (ref 135–145)
WBC # BLD: 7.28 K/UL — SIGNIFICANT CHANGE UP (ref 3.8–10.5)
WBC # FLD AUTO: 7.28 K/UL — SIGNIFICANT CHANGE UP (ref 3.8–10.5)

## 2021-09-16 PROCEDURE — 99232 SBSQ HOSP IP/OBS MODERATE 35: CPT

## 2021-09-16 RX ORDER — LANOLIN ALCOHOL/MO/W.PET/CERES
5 CREAM (GRAM) TOPICAL ONCE
Refills: 0 | Status: COMPLETED | OUTPATIENT
Start: 2021-09-16 | End: 2021-09-16

## 2021-09-16 RX ADMIN — ATORVASTATIN CALCIUM 10 MILLIGRAM(S): 80 TABLET, FILM COATED ORAL at 21:01

## 2021-09-16 RX ADMIN — Medication 6 MILLIGRAM(S): at 06:03

## 2021-09-16 RX ADMIN — Medication 5 MILLIGRAM(S): at 23:54

## 2021-09-16 RX ADMIN — REMDESIVIR 500 MILLIGRAM(S): 5 INJECTION INTRAVENOUS at 23:18

## 2021-09-16 RX ADMIN — SODIUM CHLORIDE 75 MILLILITER(S): 9 INJECTION INTRAMUSCULAR; INTRAVENOUS; SUBCUTANEOUS at 06:08

## 2021-09-16 RX ADMIN — Medication 650 MILLIGRAM(S): at 23:22

## 2021-09-16 RX ADMIN — ENOXAPARIN SODIUM 40 MILLIGRAM(S): 100 INJECTION SUBCUTANEOUS at 21:09

## 2021-09-16 NOTE — PROGRESS NOTE ADULT - PROBLEM SELECTOR PLAN 3
- DVT ppx- Lovenox 40 QD  - Diet- Regular diet  - Dispo- pending clinical improvement. Likely home.     - Unable to reach patient's wifeSupriya on 9/16    Patient is actively acute, no plan for discharge at this time pending clinical course. Patient is at high risk at the moment due to COVID19 requiring NC.

## 2021-09-16 NOTE — PROGRESS NOTE ADULT - PROBLEM SELECTOR PLAN 1
- Found to have COVID19 (9/15). Will c/w NC 4L, wean as tolerated. Monoclonal AB (Regen-Cov) as outpatient on 9/13/21   - C/w Remdesivir and decadron  - Will monitor LFTs. If continues to uptrend (ALT> 400), will obtain US abdomen and d/c Remdesivir. Stable  - ID c/s placed (Dr. Fuchs)- will plan for 5 day course. Will d/c if shows clinical improvement.

## 2021-09-17 ENCOUNTER — TRANSCRIPTION ENCOUNTER (OUTPATIENT)
Age: 31
End: 2021-09-17

## 2021-09-17 LAB
ALBUMIN SERPL ELPH-MCNC: 3.4 G/DL — SIGNIFICANT CHANGE UP (ref 3.3–5.2)
ALP SERPL-CCNC: 43 U/L — SIGNIFICANT CHANGE UP (ref 40–120)
ALT FLD-CCNC: 241 U/L — HIGH
AST SERPL-CCNC: 59 U/L — HIGH
BILIRUB DIRECT SERPL-MCNC: 0.1 MG/DL — SIGNIFICANT CHANGE UP (ref 0–0.3)
BILIRUB INDIRECT FLD-MCNC: 0.3 MG/DL — SIGNIFICANT CHANGE UP (ref 0.2–1)
BILIRUB SERPL-MCNC: 0.4 MG/DL — SIGNIFICANT CHANGE UP (ref 0.4–2)
CREAT SERPL-MCNC: 0.85 MG/DL — SIGNIFICANT CHANGE UP (ref 0.5–1.3)
PROT SERPL-MCNC: 6.2 G/DL — LOW (ref 6.6–8.7)

## 2021-09-17 PROCEDURE — 99232 SBSQ HOSP IP/OBS MODERATE 35: CPT

## 2021-09-17 RX ORDER — LANOLIN ALCOHOL/MO/W.PET/CERES
3 CREAM (GRAM) TOPICAL AT BEDTIME
Refills: 0 | Status: DISCONTINUED | OUTPATIENT
Start: 2021-09-17 | End: 2021-09-18

## 2021-09-17 RX ADMIN — Medication 650 MILLIGRAM(S): at 14:15

## 2021-09-17 RX ADMIN — ATORVASTATIN CALCIUM 10 MILLIGRAM(S): 80 TABLET, FILM COATED ORAL at 21:40

## 2021-09-17 RX ADMIN — Medication 650 MILLIGRAM(S): at 22:45

## 2021-09-17 RX ADMIN — Medication 650 MILLIGRAM(S): at 21:43

## 2021-09-17 RX ADMIN — ENOXAPARIN SODIUM 40 MILLIGRAM(S): 100 INJECTION SUBCUTANEOUS at 21:38

## 2021-09-17 RX ADMIN — Medication 6 MILLIGRAM(S): at 06:09

## 2021-09-17 RX ADMIN — REMDESIVIR 500 MILLIGRAM(S): 5 INJECTION INTRAVENOUS at 21:40

## 2021-09-17 RX ADMIN — Medication 3 MILLIGRAM(S): at 22:26

## 2021-09-17 NOTE — PROGRESS NOTE ADULT - PROBLEM SELECTOR PLAN 1
- Found to have COVID19 (9/15). Monoclonal AB (Regen-Cov) as outpatient on 9/13/21   - C/w Remdesivir and decadron D3/5  - Will monitor LFTs. If continues to uptrend (ALT> 400), will obtain US abdomen and d/c Remdesivir. Stable  - ID c/s placed (Dr. Fuchs)- will plan for 5 day course. Will d/c if shows clinical improvement.

## 2021-09-17 NOTE — PROGRESS NOTE ADULT - PROBLEM SELECTOR PLAN 3
- DVT ppx- Lovenox 40 QD  - Diet- Regular diet  - Dispo- pending clinical improvement. Likely home.     - Unable to reach patient's wife, Supriya on 9/17.    Patient is actively acute, no plan for discharge at this time pending clinical course. Patient is at high risk at the moment due to COVID19 requiring NC.

## 2021-09-17 NOTE — PROGRESS NOTE ADULT - SUBJECTIVE AND OBJECTIVE BOX
[facility] Division of Hospital Medicine    Chief Complaint: COVID19 infection    SUBJECTIVE / OVERNIGHT EVENTS: Today morning patient desatted to 87% and need NC 2L --> 4L.     Patient denies chest pain, SOB, abd pain, N/V, fever, chills, dysuria or any other complaints. All remainder ROS negative.     MEDICATIONS  (STANDING):  dexAMETHasone  Injectable 6 milliGRAM(s) IV Push daily  enoxaparin Injectable 40 milliGRAM(s) SubCutaneous two times a day  remdesivir  IVPB   IV Intermittent   remdesivir  IVPB 100 milliGRAM(s) IV Intermittent every 24 hours  sodium chloride 0.9%. 1000 milliLiter(s) (75 mL/Hr) IV Continuous <Continuous>    MEDICATIONS  (PRN):  acetaminophen   Tablet .. 650 milliGRAM(s) Oral every 6 hours PRN Severe Pain (7 - 10)        I&O's Summary      PHYSICAL EXAM:  Vital Signs Last 24 Hrs  T(C): 36.8 (15 Sep 2021 09:55), Max: 36.9 (14 Sep 2021 17:13)  T(F): 98.2 (15 Sep 2021 09:55), Max: 98.4 (14 Sep 2021 17:13)  HR: 79 (15 Sep 2021 09:55) (72 - 82)  BP: 141/87 (15 Sep 2021 09:55) (120/74 - 141/87)  BP(mean): --  RR: 18 (15 Sep 2021 09:55) (16 - 22)  SpO2: 92% (15 Sep 2021 09:55) (87% - 94%)        CONSTITUTIONAL: NAD, well-developed, well-groomed  RESPIRATORY: Normal respiratory effort; lungs are clear to auscultation bilaterally. On 4L NC.   CARDIOVASCULAR: Regular rate and rhythm, normal S1 and S2, no murmur/rub/gallop; No lower extremity edema; Peripheral pulses are 2+ bilaterally  ABDOMEN: Nontender to palpation, normoactive bowel sounds, no rebound/guarding; No hepatosplenomegaly  MUSCULOSKELETAL: no clubbing or cyanosis of digits; no joint swelling or tenderness to palpation  PSYCH: A+O to person, place, and time; affect appropriate  NEUROLOGY: CN 2-12 are intact and symmetric; no gross sensory deficits;   SKIN: No rashes; no palpable lesions    LABS:                        13.3   3.98  )-----------( 301      ( 15 Sep 2021 09:37 )             40.7     09-15    138  |  102  |  14.6  ----------------------------<  141<H>  4.7   |  24.0  |  0.73    Ca    9.1      15 Sep 2021 09:37    TPro  6.9  /  Alb  3.6  /  TBili  <0.2<L>  /  DBili  x   /  AST  181<H>  /  ALT  222<H>  /  AlkPhos  51  09-15      CARDIAC MARKERS ( 14 Sep 2021 17:11 )  x     / <0.01 ng/mL / x     / x     / x              CAPILLARY BLOOD GLUCOSE            RADIOLOGY & ADDITIONAL TESTS:  Results Reviewed:   Imaging Personally Reviewed:  Electrocardiogram Personally Reviewed:                                          
[facility]  INFECTIOUS DISEASES  at [place]  =======================================================  Osbaldo Tong MD    Diplomates American Board of Internal Medicine and Infectious Diseases  Tel  [phone]  Fax [phone]  =======================================================    [facility]-[id number]  [address] [place]   follow up:  COVID-19    reports feeling much better  clarified on dates; he had  REGEN-COV (casirivimab and imdevimab) (EUA) on 9/13/21 at tent    slept most of night w/o oxygen   feels better    remains on remdesivir here.           I have personally reviewed the labs and data; pertinent labs and data are listed in this note; please see below.   =======================================================  Past Medical & Surgical Hx:  =====================  PAST MEDICAL & SURGICAL HISTORY:  No pertinent past medical history    No significant past surgical history      Problem List:  ==========  HEALTH ISSUES - PROBLEM Dx:  2019 novel coronavirus disease (COVID-19)    Hyperlipidemia    Discharge planning issues          Social Hx:  =======  no toxic habits currently    FAMILY HISTORY:  No pertinent family history in first degree relatives    no significant family history of immunosuppressive disorders in mother or father   =======================================================    REVIEW OF SYSTEMS:  CONSTITUTIONAL:  as per HPI  HEENT:  No diplopia or blurred vision.  No earache, sore throat or runny nose.  CARDIOVASCULAR:  No pressure, squeezing, strangling, tightness, heaviness or aching about the chest, neck, axilla or epigastrium.  RESPIRATORY:  as per HPI  GASTROINTESTINAL:  No nausea, vomiting or diarrhea.  GENITOURINARY:  No dysuria, frequency or urgency. No Blood in urine  MUSCULOSKELETAL:  no joint aches, no muscle pain  SKIN:  No change in skin, hair or nails.  NEUROLOGIC:  No Headaches, seizures or weakness.  PSYCHIATRIC:  No disorder of thought or mood.  ENDOCRINE:  No heat or cold intolerance  HEMATOLOGICAL:  No easy bruising or bleeding.    =======================================================  Allergies    No Known Allergies         ======================================================  Physical Exam:  ============     General:  No acute distress.  Eye: Pupils are equal, round and reactive to light, Extraocular movements are intact, Normal conjunctiva.  HENT: Normocephalic, Oral mucosa is moist, No pharyngeal erythema, No sinus tenderness.      ON ROOM AIR    Neck: Supple, No lymphadenopathy.  Respiratory: Lungs  with COARSE breath sounds at bases, and crackles  Cardiovascular: Normal rate, Regular rhythm,   Gastrointestinal: Soft, Non-tender, Non-distended, Normal bowel sounds.  Genitourinary: No costovertebral angle tenderness.  Lymphatics: No lymphadenopathy neck,   Musculoskeletal: Normal range of motion, Normal strength.  Integumentary: No rash.  Neurologic: Alert, Oriented, No focal deficits, Cranial Nerves II-XII are grossly intact.  Psychiatric: Appropriate mood & affect.    =======================================================    Vitals:  ============  T(F): 99.8 (16 Sep 2021 08:41), Max: 99.8 (16 Sep 2021 08:41)  HR: 72 (16 Sep 2021 08:41)  BP: 119/75 (16 Sep 2021 08:41)  RR: 20 (16 Sep 2021 08:41)  SpO2: 94% (16 Sep 2021 08:41) (90% - 100%)  temp max in last 48H T(F): , Max: 99.8 (09-16-21 @ 08:41)    =======================================================  Current Antibiotics:  remdesivir  IVPB   IV Intermittent   remdesivir  IVPB 100 milliGRAM(s) IV Intermittent every 24 hours    Other medications:  atorvastatin 10 milliGRAM(s) Oral at bedtime  dexAMETHasone  Injectable 6 milliGRAM(s) IV Push daily  enoxaparin Injectable 40 milliGRAM(s) SubCutaneous daily  influenza   Vaccine 0.5 milliLiter(s) IntraMuscular once  sodium chloride 0.9%. 1000 milliLiter(s) IV Continuous <Continuous>      =======================================================  Labs:                        12.5   7.28  )-----------( 323      ( 16 Sep 2021 07:37 )             38.8     09-16    140  |  105  |  17.6  ----------------------------<  123<H>  4.4   |  23.0  |  0.78    Ca    8.4<L>      16 Sep 2021 07:37    TPro  6.3<L>  /  Alb  3.4  /  TBili  <0.2<L>  /  DBili  0.1  /  AST  115<H>  /  ALT  238<H>  /  AlkPhos  44  09-16      Creatinine, Serum: 0.78 mg/dL (09-16-21 @ 07:37)  Creatinine, Serum: 0.73 mg/dL (09-15-21 @ 09:37)  Creatinine, Serum: 0.79 mg/dL (09-14-21 @ 17:11)    Procalcitonin, Serum: 0.08 ng/mL (09-14-21 @ 17:11)      Ferritin, Serum: 1699 ng/mL (09-14-21 @ 17:11)    C-Reactive Protein, Serum: 46 mg/L (09-14-21 @ 17:11)    WBC Count: 7.28 K/uL (09-16-21 @ 07:37)  WBC Count: 3.98 K/uL (09-15-21 @ 09:37)  WBC Count: 6.79 K/uL (09-14-21 @ 17:11)      COVID-19 PCR: Detected (09-14-21 @ 17:11)      Alkaline Phosphatase, Serum: 44 U/L (09-16-21 @ 07:37)  Alkaline Phosphatase, Serum: 51 U/L (09-15-21 @ 09:37)  Alkaline Phosphatase, Serum: 43 U/L (09-14-21 @ 17:11)  Alanine Aminotransferase (ALT/SGPT): 238 U/L (09-16-21 @ 07:37)  Alanine Aminotransferase (ALT/SGPT): 222 U/L (09-15-21 @ 09:37)  Alanine Aminotransferase (ALT/SGPT): 123 U/L (09-14-21 @ 17:11)  Aspartate Aminotransferase (AST/SGOT): 115 U/L (09-16-21 @ 07:37)  Aspartate Aminotransferase (AST/SGOT): 181 U/L (09-15-21 @ 09:37)  Aspartate Aminotransferase (AST/SGOT): 97 U/L (09-14-21 @ 17:11)  Bilirubin Total, Serum: <0.2 mg/dL (09-16-21 @ 07:37)  Bilirubin Total, Serum: <0.2 mg/dL (09-15-21 @ 09:37)  Bilirubin Total, Serum: 0.3 mg/dL (09-14-21 @ 17:11)  Bilirubin Direct, Serum: 0.1 mg/dL (09-16-21 @ 07:37)         < from: CT Angio Chest PE Protocol w/ IV Cont (09.14.21 @ 21:58) >     EXAM:  CT ANGIO CHEST PULM [facility]                          PROCEDURE DATE:  09/14/2021          INTERPRETATION:  CLINICAL INFORMATION: Shortness of breath, hypoxic, history of Covid    COMPARISON: None.    CONTRAST/COMPLICATIONS:  IV Contrast: Omnipaque 350  72 cc administered   28 cc discarded  Oral Contrast: NONE  Complications: None reported at time of study completion    PROCEDURE:  CT Angiography of the Chest.  Sagittal and coronal reformats were performed as well as 3D (MIP) reconstructions.    FINDINGS:    LUNGS AND AIRWAYS: Groundglass infiltrates with peripheral and basilar predominance. Central airways are clear.  PLEURA: No pleural effusion.  MEDIASTINUM AND TOMA: No lymphadenopathy.  VESSELS: Pulmonary arteries are opacifiedto the segmental level. No acute thromboembolus. Main pulmonary artery is normal in caliber.  Thoracic aorta normal in caliber without dissection.  HEART: Heart size is normal. No pericardial effusion.  CHEST WALL AND LOWER NECK: Within normal limits.  VISUALIZED UPPER ABDOMEN: Within normal limits.  BONES: No acute osseous abnormality    IMPRESSION:    1. Pattern of GGO suggests infection including atypical pneumonia/viral infection from atypical agents including COVID-19 (C19V-1).  2. No acute pulmonary embolus.        --- End of Report ---            ALAN LINCOLN MD; Attending Radiologist  This document has been electronically signed. Sep 14 2021 10:23PM    < end of copied text >      
[facility]  INFECTIOUS DISEASES  at [place]  =======================================================  Osbaldo Tong MD    Diplomates American Board of Internal Medicine and Infectious Diseases  Tel  [phone]  Fax [phone]  =======================================================    [facility]-[id number]  [address] [place]   follow up:  COVID-19  s/p REGEN-COV (casirivimab and imdevimab) (EUA) on 9/13/21 at [facility]    reports feeling much better  on intermittent oxygen overnight      I have personally reviewed the labs and data; pertinent labs and data are listed in this note; please see below.   =======================================================  Past Medical & Surgical Hx:  =====================  PAST MEDICAL & SURGICAL HISTORY:  No pertinent past medical history    No significant past surgical history      Problem List:  ==========  HEALTH ISSUES - PROBLEM Dx:  2019 novel coronavirus disease (COVID-19)  Hyperlipidemia  Discharge planning issues      Social Hx:  =======  no toxic habits currently    FAMILY HISTORY:  No pertinent family history in first degree relatives    no significant family history of immunosuppressive disorders in mother or father   =======================================================    REVIEW OF SYSTEMS:  CONSTITUTIONAL:  as per HPI  HEENT:  No diplopia or blurred vision.  No earache, sore throat or runny nose.  CARDIOVASCULAR:  No pressure, squeezing, strangling, tightness, heaviness or aching about the chest, neck, axilla or epigastrium.  RESPIRATORY:  as per HPI  GASTROINTESTINAL:  No nausea, vomiting or diarrhea.  GENITOURINARY:  No dysuria, frequency or urgency. No Blood in urine  MUSCULOSKELETAL:  no joint aches, no muscle pain  SKIN:  No change in skin, hair or nails.  NEUROLOGIC:  No Headaches, seizures or weakness.  PSYCHIATRIC:  No disorder of thought or mood.  ENDOCRINE:  No heat or cold intolerance  HEMATOLOGICAL:  No easy bruising or bleeding.    =======================================================  Allergies  No Known Allergies    ======================================================  Physical Exam:  ============     General:  No acute distress.  Eye: Pupils are equal, round and reactive to light, Extraocular movements are intact, Normal conjunctiva.  HENT: Normocephalic, Oral mucosa is moist, No pharyngeal erythema, No sinus tenderness.      on intermittent NASAL CANNULA    Neck: Supple, No lymphadenopathy.  Respiratory: Lungs  with COARSE breath sounds at bases, and crackles  Cardiovascular: Normal rate, Regular rhythm,   Gastrointestinal: Soft, Non-tender, Non-distended, Normal bowel sounds.  Genitourinary: No costovertebral angle tenderness.  Lymphatics: No lymphadenopathy neck,   Musculoskeletal: Normal range of motion, Normal strength.  Integumentary: No rash.  Neurologic: Alert, Oriented, No focal deficits, Cranial Nerves II-XII are grossly intact.  Psychiatric: Appropriate mood & affect.    =======================================================   Vitals:  ============  T(F): 98.2 (17 Sep 2021 08:09), Max: 98.6 (16 Sep 2021 21:07)  HR: 48 (17 Sep 2021 08:09)  BP: 120/75 (17 Sep 2021 08:09)  RR: 18 (17 Sep 2021 09:12)  SpO2: 89% (17 Sep 2021 09:12) (89% - 96%)  temp max in last 48H T(F): , Max: 99.8 (09-16-21 @ 08:41)    =======================================================  Current Antibiotics:  remdesivir  IVPB   IV Intermittent   remdesivir  IVPB 100 milliGRAM(s) IV Intermittent every 24 hours    Other medications:  atorvastatin 10 milliGRAM(s) Oral at bedtime  dexAMETHasone  Injectable 6 milliGRAM(s) IV Push daily  enoxaparin Injectable 40 milliGRAM(s) SubCutaneous daily  influenza   Vaccine 0.5 milliLiter(s) IntraMuscular once  sodium chloride 0.9%. 1000 milliLiter(s) IV Continuous <Continuous>      =======================================================  Labs:                        12.5   7.28  )-----------( 323      ( 16 Sep 2021 07:37 )             38.8     09-17    x   |  x   |  x   ----------------------------<  x   x    |  x   |  0.85    Ca    8.4<L>      16 Sep 2021 07:37    TPro  6.2<L>  /  Alb  3.4  /  TBili  0.4  /  DBili  0.1  /  AST  59<H>  /  ALT  241<H>  /  AlkPhos  43  09-17      Culture - Blood (collected 09-14-21 @ 17:13)  Source: .Blood Blood-Peripheral    Culture - Blood (collected 09-14-21 @ 17:12)  Source: .Blood Blood-Peripheral      Creatinine, Serum: 0.85 mg/dL (09-17-21 @ 08:06)  Creatinine, Serum: 0.78 mg/dL (09-16-21 @ 07:37)  Creatinine, Serum: 0.73 mg/dL (09-15-21 @ 09:37)  Creatinine, Serum: 0.79 mg/dL (09-14-21 @ 17:11)    Procalcitonin, Serum: 0.08 ng/mL (09-14-21 @ 17:11)      Ferritin, Serum: 1699 ng/mL (09-14-21 @ 17:11)    C-Reactive Protein, Serum: 46 mg/L (09-14-21 @ 17:11)    WBC Count: 7.28 K/uL (09-16-21 @ 07:37)  WBC Count: 3.98 K/uL (09-15-21 @ 09:37)  WBC Count: 6.79 K/uL (09-14-21 @ 17:11)      COVID-19 PCR: Detected (09-14-21 @ 17:11)      Alkaline Phosphatase, Serum: 43 U/L (09-17-21 @ 08:06)  Alkaline Phosphatase, Serum: 44 U/L (09-16-21 @ 07:37)  Alkaline Phosphatase, Serum: 51 U/L (09-15-21 @ 09:37)  Alkaline Phosphatase, Serum: 43 U/L (09-14-21 @ 17:11)  Alanine Aminotransferase (ALT/SGPT): 241 U/L (09-17-21 @ 08:06)  Alanine Aminotransferase (ALT/SGPT): 238 U/L (09-16-21 @ 07:37)  Alanine Aminotransferase (ALT/SGPT): 222 U/L (09-15-21 @ 09:37)  Alanine Aminotransferase (ALT/SGPT): 123 U/L (09-14-21 @ 17:11)  Aspartate Aminotransferase (AST/SGOT): 59 U/L (09-17-21 @ 08:06)  Aspartate Aminotransferase (AST/SGOT): 115 U/L (09-16-21 @ 07:37)  Aspartate Aminotransferase (AST/SGOT): 181 U/L (09-15-21 @ 09:37)  Aspartate Aminotransferase (AST/SGOT): 97 U/L (09-14-21 @ 17:11)  Bilirubin Total, Serum: 0.4 mg/dL (09-17-21 @ 08:06)  Bilirubin Total, Serum: <0.2 mg/dL (09-16-21 @ 07:37)  Bilirubin Total, Serum: <0.2 mg/dL (09-15-21 @ 09:37)  Bilirubin Total, Serum: 0.3 mg/dL (09-14-21 @ 17:11)  Bilirubin Direct, Serum: 0.1 mg/dL (09-17-21 @ 08:06)  Bilirubin Direct, Serum: 0.1 mg/dL (09-16-21 @ 07:37)    ======================================================            < from: CT Angio Chest PE Protocol w/ IV Cont (09.14.21 @ 21:58) >     EXAM:  CT ANGIO CHEST PULM [facility]                          PROCEDURE DATE:  09/14/2021          INTERPRETATION:  CLINICAL INFORMATION: Shortness of breath, hypoxic, history of Covid    COMPARISON: None.    CONTRAST/COMPLICATIONS:  IV Contrast: Omnipaque 350  72 cc administered   28 cc discarded  Oral Contrast: NONE  Complications: None reported at time of study completion    PROCEDURE:  CT Angiography of the Chest.  Sagittal and coronal reformats were performed as well as 3D (MIP) reconstructions.    FINDINGS:    LUNGS AND AIRWAYS: Groundglass infiltrates with peripheral and basilar predominance. Central airways are clear.  PLEURA: No pleural effusion.  MEDIASTINUM AND TOMA: No lymphadenopathy.  VESSELS: Pulmonary arteries are opacifiedto the segmental level. No acute thromboembolus. Main pulmonary artery is normal in caliber.  Thoracic aorta normal in caliber without dissection.  HEART: Heart size is normal. No pericardial effusion.  CHEST WALL AND LOWER NECK: Within normal limits.  VISUALIZED UPPER ABDOMEN: Within normal limits.  BONES: No acute osseous abnormality    IMPRESSION:    1. Pattern of GGO suggests infection including atypical pneumonia/viral infection from atypical agents including COVID-19 (C19V-1).  2. No acute pulmonary embolus.        --- End of Report ---            ALAN LINCOLN MD; Attending Radiologist  This document has been electronically signed. Sep 14 2021 10:23PM    < end of copied text >      
[facility] Division of Hospital Medicine    Chief Complaint: COVID19 infection    SUBJECTIVE / OVERNIGHT EVENTS: No acute events overnight. Patient was satting 89% on RA. He was put back on NC.     Patient denies chest pain, SOB, abd pain, N/V, fever, chills, dysuria or any other complaints. All remainder ROS negative.     MEDICATIONS  (STANDING):  atorvastatin 10 milliGRAM(s) Oral at bedtime  dexAMETHasone  Injectable 6 milliGRAM(s) IV Push daily  enoxaparin Injectable 40 milliGRAM(s) SubCutaneous daily  influenza   Vaccine 0.5 milliLiter(s) IntraMuscular once  remdesivir  IVPB   IV Intermittent   remdesivir  IVPB 100 milliGRAM(s) IV Intermittent every 24 hours  sodium chloride 0.9%. 1000 milliLiter(s) (75 mL/Hr) IV Continuous <Continuous>    MEDICATIONS  (PRN):  acetaminophen   Tablet .. 650 milliGRAM(s) Oral every 6 hours PRN Severe Pain (7 - 10)  benzonatate 100 milliGRAM(s) Oral three times a day PRN Cough        I&O's Summary      PHYSICAL EXAM:  Vital Signs Last 24 Hrs  T(C): 36.8 (17 Sep 2021 08:09), Max: 37 (16 Sep 2021 21:07)  T(F): 98.2 (17 Sep 2021 08:09), Max: 98.6 (16 Sep 2021 21:07)  HR: 48 (17 Sep 2021 08:09) (48 - 88)  BP: 120/75 (17 Sep 2021 08:09) (120/75 - 149/91)  BP(mean): --  RR: 18 (17 Sep 2021 09:12) (18 - 20)  SpO2: 89% (17 Sep 2021 09:12) (89% - 96%)          CONSTITUTIONAL: NAD, well-developed, well-groomed  RESPIRATORY: Normal respiratory effort; lungs are clear to auscultation bilaterally  CARDIOVASCULAR: Regular rate and rhythm, normal S1 and S2, no murmur/rub/gallop  ABDOMEN: Nontender to palpation, normoactive bowel sounds  MUSCULOSKELETAL: no clubbing or cyanosis of digits; no joint swelling or tenderness to palpation  PSYCH: A+O to person, place, and time; affect appropriate  NEUROLOGY: CN 2-12 are intact and symmetric; no gross sensory deficits      LABS:                        12.5   7.28  )-----------( 323      ( 16 Sep 2021 07:37 )             38.8     09-17    x   |  x   |  x   ----------------------------<  x   x    |  x   |  0.85    Ca    8.4<L>      16 Sep 2021 07:37    TPro  6.2<L>  /  Alb  3.4  /  TBili  0.4  /  DBili  0.1  /  AST  59<H>  /  ALT  241<H>  /  AlkPhos  43  09-17              Culture - Blood (collected 14 Sep 2021 17:13)  Source: .Blood Blood-Peripheral  Preliminary Report (16 Sep 2021 19:00):    No growth at 48 hours    Culture - Blood (collected 14 Sep 2021 17:12)  Source: .Blood Blood-Peripheral  Preliminary Report (16 Sep 2021 19:00):    No growth at 48 hours      CAPILLARY BLOOD GLUCOSE            RADIOLOGY & ADDITIONAL TESTS:  Results Reviewed:   Imaging Personally Reviewed:  Electrocardiogram Personally Reviewed:                                          
[facility] Division of Hospital Medicine    Chief Complaint:  COVID19 infection    SUBJECTIVE / OVERNIGHT EVENTS: No acute events overnight. Patient endorses dyspnea while ambulating. Transitioned to RA while resting.     Patient denies chest pain, SOB, abd pain, N/V, fever, chills, dysuria or any other complaints. All remainder ROS negative.     MEDICATIONS  (STANDING):  atorvastatin 10 milliGRAM(s) Oral at bedtime  dexAMETHasone  Injectable 6 milliGRAM(s) IV Push daily  enoxaparin Injectable 40 milliGRAM(s) SubCutaneous daily  influenza   Vaccine 0.5 milliLiter(s) IntraMuscular once  remdesivir  IVPB   IV Intermittent   remdesivir  IVPB 100 milliGRAM(s) IV Intermittent every 24 hours  sodium chloride 0.9%. 1000 milliLiter(s) (75 mL/Hr) IV Continuous <Continuous>    MEDICATIONS  (PRN):  acetaminophen   Tablet .. 650 milliGRAM(s) Oral every 6 hours PRN Severe Pain (7 - 10)        I&O's Summary    15 Sep 2021 07:01  -  16 Sep 2021 07:00  --------------------------------------------------------  IN: 0 mL / OUT: 700 mL / NET: -700 mL        PHYSICAL EXAM:  Vital Signs Last 24 Hrs  T(C): 37.7 (16 Sep 2021 08:41), Max: 37.7 (16 Sep 2021 08:41)  T(F): 99.8 (16 Sep 2021 08:41), Max: 99.8 (16 Sep 2021 08:41)  HR: 72 (16 Sep 2021 08:41) (58 - 82)  BP: 119/75 (16 Sep 2021 08:41) (119/75 - 149/98)  BP(mean): --  RR: 20 (16 Sep 2021 08:41) (18 - 20)  SpO2: 94% (16 Sep 2021 08:41) (90% - 100%)        CONSTITUTIONAL: NAD, well-developed, well-groomed  RESPIRATORY: Normal respiratory effort; lungs are clear to auscultation bilaterally. On 4L NC.   CARDIOVASCULAR: Regular rate and rhythm, normal S1 and S2, no murmur/rub/gallop; No lower extremity edema; Peripheral pulses are 2+ bilaterally  ABDOMEN: Nontender to palpation, normoactive bowel sounds, no rebound/guarding; No hepatosplenomegaly  MUSCULOSKELETAL: no clubbing or cyanosis of digits; no joint swelling or tenderness to palpation  PSYCH: A+O to person, place, and time; affect appropriate  NEUROLOGY: CN 2-12 are intact and symmetric; no gross sensory deficits;         LABS:                        12.5   7.28  )-----------( 323      ( 16 Sep 2021 07:37 )             38.8     09-16    140  |  105  |  17.6  ----------------------------<  123<H>  4.4   |  23.0  |  0.78    Ca    8.4<L>      16 Sep 2021 07:37    TPro  6.3<L>  /  Alb  3.4  /  TBili  <0.2<L>  /  DBili  0.1  /  AST  115<H>  /  ALT  238<H>  /  AlkPhos  44  09-16      CARDIAC MARKERS ( 14 Sep 2021 17:11 )  x     / <0.01 ng/mL / x     / x     / x              CAPILLARY BLOOD GLUCOSE            RADIOLOGY & ADDITIONAL TESTS:  Results Reviewed:   Imaging Personally Reviewed:  Electrocardiogram Personally Reviewed:

## 2021-09-17 NOTE — DISCHARGE NOTE PROVIDER - CARE PROVIDER_API CALL
primary care doctor,   Phone: (   )    -  Fax: (   )    -  Follow Up Time:     Gavin Fuchs)  Infectious Disease; Internal Medicine  [address] [place] [address]  ,  [facility]  Phone: [phone]  Fax: [phone]  Follow Up Time:

## 2021-09-17 NOTE — PROGRESS NOTE ADULT - ASSESSMENT
This 30 yo male with CURRENT MEDICAL CONDITIONS of HLD who presents from home with SOB after being diagnosed with COVID-19 12 days ago. Patient received monoclonal antibodies yesterday. Was supposed to start on dexamethasone but came here. Patient denies chest pain, leg pain/swelling. Endorses some fever/chills that have largely passed. Patient has not received vaccine. Patient reports at home he had O2 sat as low as 89%. Reportedly in the ED he had oxygen saturation in the low 80's. Medicine was called for admission. At this time patient feels comfortable on room air, he admits to exertional SOB. (14 Sep 2021 22:36)    he reports that he started feeling sick around labor day. 9/6/21.  several days prior to coming to hospital he became increasing SOB. went to the pharmacy and bought  a pulse ox monitor.  found that it was low.   patient still has SOB. On oxygen here.   COVID testing is confirmed positive here.   CT of the chest showed:   1. Pattern of GGO suggests infection including atypical pneumonia/viral infection from atypical agents including COVID-19 (C19V-1).  2. No acute pulmonary embolus.    he was started on remdesivir and steroids here.     Impression:  COVID-19 infection   Viral pneumonia  shortness of breath  lung infiltrates  dependence on oxygen    Plan:  - continue Remdesivir IV daily.  will plan for a 5 day course.  THRU 9/18  - will watch and decide if additional Doses would be needed.       Patient had received Monoclonal AB (Regen-Cov) as outpatient.  9/13/21       - continue dexamethasone 6mg daily, While on remdesivir  trend Inflammatory markers and LFTs   - trend CBC with diff, CMP  daily    - Continue supportive care measures  - continue Oxygenation as needed;  CONTINUE to titrate down as tolerated  - self- proning  as tolerated  - ENCOURAGED OOB to chair  - encouraged incentive spirometry       Will follow with you.  
This 32 yo male with CURRENT MEDICAL CONDITIONS of HLD who presents from home with SOB after being diagnosed with COVID-19 12 days ago. Patient received monoclonal antibodies yesterday. Was supposed to start on dexamethasone but came here. Patient denies chest pain, leg pain/swelling. Endorses some fever/chills that have largely passed. Patient has not received vaccine. Patient reports at home he had O2 sat as low as 89%. Reportedly in the ED he had oxygen saturation in the low 80's. Medicine was called for admission. At this time patient feels comfortable on room air, he admits to exertional SOB. (14 Sep 2021 22:36)    he reports that he started feeling sick around labor day. 9/6/21.  several days prior to coming to hospital he became increasing SOB. went to the pharmacy and bought  a pulse ox monitor.  found that it was low.   patient still has SOB. On oxygen here.   COVID testing is confirmed positive here.   CT of the chest showed:   1. Pattern of GGO suggests infection including atypical pneumonia/viral infection from atypical agents including COVID-19 (C19V-1).  2. No acute pulmonary embolus.    he was started on remdesivir and steroids here.     Impression:  COVID-19 infection   Viral pneumonia  shortness of breath  lung infiltrates  dependence on oxygen    Plan:  - continue Remdesivir IV daily.  will plan for a 5 day course.   - MAY stop earlier than 5 days  and send home,  PATIENT IS back on Ambient oxygen/ room air.      Patient had received Monoclonal AB (Regen-Cov) as outpatient.  9/13/21       - continue dexamethasone 6mg daily, While on remdesivir  trend Inflammatory markers and LFTs   - trend CBC with diff, CMP  daily    - Continue supportive care measures  - continue Oxygenation as needed;  CONTINUE to titrate down as tolerated  - self- proning  as tolerated  - ENCOURAGED OOB to chair  - encouraged incentive spirometry       Will follow with you.  
32 yo male with HLD who presents from home with SOB after being diagnosed with COVID-19 12 days ago. Now admitted with exertional hypoxia from COVID19. Pending clinical improvement. 
30 yo male with HLD who presents from home with SOB after being diagnosed with COVID-19 12 days ago. Now admitted with exertional hypoxia from COVID 19. Now requiring NC. 
30 yo male with HLD who presents from home with SOB after being diagnosed with COVID-19 12 days ago. Now admitted with exertional hypoxia from COVID19. Pending clinical improvement.

## 2021-09-17 NOTE — DISCHARGE NOTE PROVIDER - NSDCMRMEDTOKEN_GEN_ALL_CORE_FT
Tylenol 325 mg oral tablet:    acetaminophen 325 mg oral tablet: 2 tab(s) orally every 6 hours, As needed, Severe Pain (7 - 10)

## 2021-09-17 NOTE — DISCHARGE NOTE PROVIDER - HOSPITAL COURSE
32 yo male with HLD who presents from home with SOB after being diagnosed with COVID-19 12 days ago. Now admitted with exertional hypoxia from COVID19. Patient required NC given that he was desatting to 89% at rest. In addition, he was started on Remdesivir/Dexamethasone. Hospital c/b transaminitis likely 2/2 remdesivir. His LFTs stabilized and remdesivir was continued. Patient symptoms improved and he is medically optimized for discharge to home. 32 yo male with HLD who presents from home with SOB after being diagnosed with COVID-19 12 days ago. Now admitted with exertional hypoxia from COVID19. Patient required NC given that he was desatting to 89% at rest. In addition, he was started on Remdesivir/Dexamethasonex5 days. Hospital c/b transaminitis likely 2/2 remdesivir. His LFTs stabilized and remdesivir was continued. Patient symptoms improved and he is medically optimized for discharge to home.  dc planning 32 minutes  vitals stable afebrile. no acute complaints. ambulating in hallways w/o issues. no o2 needs  aaox3 nad rrr s1s2 ctab soft abd no Le edema nonfocal neuro ambulatory

## 2021-09-17 NOTE — PROGRESS NOTE ADULT - PROBLEM SELECTOR PLAN 2
- patient intermittently takes Lipitor 10 mg

## 2021-09-17 NOTE — DISCHARGE NOTE PROVIDER - CARE PROVIDERS DIRECT ADDRESSES
,DirectAddress_Unknown,[email or web address]

## 2021-09-17 NOTE — DISCHARGE NOTE PROVIDER - NSDCCPCAREPLAN_GEN_ALL_CORE_FT
PRINCIPAL DISCHARGE DIAGNOSIS  Diagnosis: COVID-19  Assessment and Plan of Treatment: You tested positive for COVID19. We started dexamethasone and remdesivir. In addition, you required nasal cannula because you were desatting.   You presented with COVID-19 virus infection. You were treated with Plaquenil and azithromycin. You were also provided oxygen delivery treatment.  It has been determined that you no longer need hospitalization and can recover while remaining in self-quarantine at home. You should follow the prevention steps below until a healthcare provider or local or state health department says you can return to your normal activities.  1. You should restrict activities outside your home, except for getting medical care.  2. Do not go to work, school, or public areas.  3. Avoid using public transportation, ride-sharing, or taxis.  4. Separate yourself from other people and animals in your home.  5. Call ahead before visiting your doctor.  6. Wear a facemask.  7. Cover your coughs and sneezes.  8. Clean your hands often.  9. Avoid sharing personal household items.  10. Clean all "high-touch" surfaces everyday.  11. Monitor your symptoms.  If you have a medical emergency and need to call 911, notify the dispatch personnel that you have COVID-19 If possible, put on a facemask before emergency medical services arrive.  12. Stopping home isolation.  Patients with confirmed COVID-19 should remain under home isolation precautions for 14 days since the positive COVID-19 test and until the risk of secondary transmission to others is thought to be low. The decision to discontinue home isolation precautions should be made on a case-by-case basis, in consultation with healthcare providers and state and local health departments. Your [facility] Department of Health can be reached at [phone] for further information about COVID-19.

## 2021-09-17 NOTE — DISCHARGE NOTE PROVIDER - PROVIDER TOKENS
FREE:[LAST:[primary care doctor],PHONE:[(   )    -],FAX:[(   )    -]],PROVIDER:[TOKEN:[[id number]]]

## 2021-09-18 ENCOUNTER — TRANSCRIPTION ENCOUNTER (OUTPATIENT)
Age: 31
End: 2021-09-18

## 2021-09-18 LAB
ALBUMIN SERPL ELPH-MCNC: 3.7 G/DL — SIGNIFICANT CHANGE UP (ref 3.3–5.2)
ALP SERPL-CCNC: 51 U/L — SIGNIFICANT CHANGE UP (ref 40–120)
ALT FLD-CCNC: 186 U/L — HIGH
ANION GAP SERPL CALC-SCNC: 11 MMOL/L — SIGNIFICANT CHANGE UP (ref 5–17)
AST SERPL-CCNC: 44 U/L — HIGH
BILIRUB DIRECT SERPL-MCNC: 0.1 MG/DL — SIGNIFICANT CHANGE UP (ref 0–0.3)
BILIRUB INDIRECT FLD-MCNC: 0.4 MG/DL — SIGNIFICANT CHANGE UP (ref 0.2–1)
BILIRUB SERPL-MCNC: 0.6 MG/DL — SIGNIFICANT CHANGE UP (ref 0.4–2)
BUN SERPL-MCNC: 16.7 MG/DL — SIGNIFICANT CHANGE UP (ref 8–20)
CALCIUM SERPL-MCNC: 9 MG/DL — SIGNIFICANT CHANGE UP (ref 8.6–10.2)
CHLORIDE SERPL-SCNC: 98 MMOL/L — SIGNIFICANT CHANGE UP (ref 98–107)
CO2 SERPL-SCNC: 25 MMOL/L — SIGNIFICANT CHANGE UP (ref 22–29)
CREAT SERPL-MCNC: 0.73 MG/DL — SIGNIFICANT CHANGE UP (ref 0.5–1.3)
GLUCOSE SERPL-MCNC: 125 MG/DL — HIGH (ref 70–99)
HCT VFR BLD CALC: 41.8 % — SIGNIFICANT CHANGE UP (ref 39–50)
HGB BLD-MCNC: 13.7 G/DL — SIGNIFICANT CHANGE UP (ref 13–17)
MCHC RBC-ENTMCNC: 29.5 PG — SIGNIFICANT CHANGE UP (ref 27–34)
MCHC RBC-ENTMCNC: 32.8 GM/DL — SIGNIFICANT CHANGE UP (ref 32–36)
MCV RBC AUTO: 89.9 FL — SIGNIFICANT CHANGE UP (ref 80–100)
PLATELET # BLD AUTO: 435 K/UL — HIGH (ref 150–400)
POTASSIUM SERPL-MCNC: 4.6 MMOL/L — SIGNIFICANT CHANGE UP (ref 3.5–5.3)
POTASSIUM SERPL-SCNC: 4.6 MMOL/L — SIGNIFICANT CHANGE UP (ref 3.5–5.3)
PROT SERPL-MCNC: 7 G/DL — SIGNIFICANT CHANGE UP (ref 6.6–8.7)
RBC # BLD: 4.65 M/UL — SIGNIFICANT CHANGE UP (ref 4.2–5.8)
RBC # FLD: 13.8 % — SIGNIFICANT CHANGE UP (ref 10.3–14.5)
SODIUM SERPL-SCNC: 134 MMOL/L — LOW (ref 135–145)
WBC # BLD: 11.5 K/UL — HIGH (ref 3.8–10.5)
WBC # FLD AUTO: 11.5 K/UL — HIGH (ref 3.8–10.5)

## 2021-09-18 PROCEDURE — 86769 SARS-COV-2 COVID-19 ANTIBODY: CPT

## 2021-09-18 PROCEDURE — 82565 ASSAY OF CREATININE: CPT

## 2021-09-18 PROCEDURE — 80053 COMPREHEN METABOLIC PANEL: CPT

## 2021-09-18 PROCEDURE — 84484 ASSAY OF TROPONIN QUANT: CPT

## 2021-09-18 PROCEDURE — 86140 C-REACTIVE PROTEIN: CPT

## 2021-09-18 PROCEDURE — 80076 HEPATIC FUNCTION PANEL: CPT

## 2021-09-18 PROCEDURE — 71045 X-RAY EXAM CHEST 1 VIEW: CPT

## 2021-09-18 PROCEDURE — 85025 COMPLETE CBC W/AUTO DIFF WBC: CPT

## 2021-09-18 PROCEDURE — 82248 BILIRUBIN DIRECT: CPT

## 2021-09-18 PROCEDURE — 36415 COLL VENOUS BLD VENIPUNCTURE: CPT

## 2021-09-18 PROCEDURE — 80061 LIPID PANEL: CPT

## 2021-09-18 PROCEDURE — 96375 TX/PRO/DX INJ NEW DRUG ADDON: CPT

## 2021-09-18 PROCEDURE — U0005: CPT

## 2021-09-18 PROCEDURE — 99285 EMERGENCY DEPT VISIT HI MDM: CPT | Mod: 25

## 2021-09-18 PROCEDURE — 82728 ASSAY OF FERRITIN: CPT

## 2021-09-18 PROCEDURE — 84145 PROCALCITONIN (PCT): CPT

## 2021-09-18 PROCEDURE — 85027 COMPLETE CBC AUTOMATED: CPT

## 2021-09-18 PROCEDURE — 96374 THER/PROPH/DIAG INJ IV PUSH: CPT

## 2021-09-18 PROCEDURE — 71275 CT ANGIOGRAPHY CHEST: CPT | Mod: MA

## 2021-09-18 PROCEDURE — 87040 BLOOD CULTURE FOR BACTERIA: CPT

## 2021-09-18 PROCEDURE — 99239 HOSP IP/OBS DSCHRG MGMT >30: CPT

## 2021-09-18 PROCEDURE — U0003: CPT

## 2021-09-18 RX ORDER — ACETAMINOPHEN 500 MG
0 TABLET ORAL
Qty: 0 | Refills: 0 | DISCHARGE

## 2021-09-18 RX ORDER — ACETAMINOPHEN 500 MG
2 TABLET ORAL
Qty: 0 | Refills: 0 | DISCHARGE
Start: 2021-09-18

## 2021-09-18 RX ADMIN — Medication 6 MILLIGRAM(S): at 05:39

## 2021-09-18 NOTE — DISCHARGE NOTE NURSING/CASE MANAGEMENT/SOCIAL WORK - PATIENT PORTAL LINK FT
You can access the FollowMyHealth Patient Portal offered by [facility] by registering at the following website: [email or web address]. By joining [employer]’s FollowMyHealth portal, you will also be able to view your health information using other applications (apps) compatible with our system.

## 2021-09-19 VITALS
OXYGEN SATURATION: 90 % | DIASTOLIC BLOOD PRESSURE: 90 MMHG | HEART RATE: 69 BPM | RESPIRATION RATE: 18 BRPM | SYSTOLIC BLOOD PRESSURE: 145 MMHG

## 2021-09-19 LAB
CULTURE RESULTS: SIGNIFICANT CHANGE UP
CULTURE RESULTS: SIGNIFICANT CHANGE UP
SPECIMEN SOURCE: SIGNIFICANT CHANGE UP
SPECIMEN SOURCE: SIGNIFICANT CHANGE UP

## 2021-09-20 DIAGNOSIS — M79.89 OTHER SPECIFIED SOFT TISSUE DISORDERS: ICD-10-CM

## 2021-09-22 ENCOUNTER — NON-APPOINTMENT (OUTPATIENT)
Age: 31
End: 2021-09-22

## 2021-09-28 ENCOUNTER — APPOINTMENT (OUTPATIENT)
Dept: INTERNAL MEDICINE | Facility: CLINIC | Age: 31
End: 2021-09-28
Payer: COMMERCIAL

## 2021-09-28 VITALS
TEMPERATURE: 99.1 F | SYSTOLIC BLOOD PRESSURE: 133 MMHG | RESPIRATION RATE: 16 BRPM | WEIGHT: 241 LBS | DIASTOLIC BLOOD PRESSURE: 88 MMHG | HEART RATE: 76 BPM | HEIGHT: 74 IN | BODY MASS INDEX: 30.93 KG/M2

## 2021-09-28 DIAGNOSIS — Z09 ENCOUNTER FOR FOLLOW-UP EXAMINATION AFTER COMPLETED TREATMENT FOR CONDITIONS OTHER THAN MALIGNANT NEOPLASM: ICD-10-CM

## 2021-09-28 DIAGNOSIS — U07.1 COVID-19: ICD-10-CM

## 2021-09-28 PROCEDURE — 99495 TRANSJ CARE MGMT MOD F2F 14D: CPT

## 2021-09-28 NOTE — HEALTH RISK ASSESSMENT
[0] : 2) Feeling down, depressed, or hopeless: Not at all (0) [PHQ-2 Negative - No further assessment needed] : PHQ-2 Negative - No further assessment needed [[id number]] : 0

## 2021-09-28 NOTE — HISTORY OF PRESENT ILLNESS
[FreeTextEntry1] : hospital discharge follow up  [de-identified] : Mr. DAVE NOLAN is a 31 year male with a PMH of HLD comes to the office for hospital discharge follow up. \par \par On 09/14/21 patient  presented to [facility] from home with SOB after being diagnosed with COVID-19 on 09/05/21.  Patient was admitted with exertional hypoxia from COVID19. Patient required NC given that he was desatting to 89% at rest. In addition, he was started on Remdesivir/Dexamethasone x 5 days. Hospital c/b transaminitis likely 2/2 remdesivir. His LFTs stabilized and remdesivir was continued. \par Patient symptoms improved and he was discharged om 09/18/21 vitals stable afebrile, ambulating in hallways w/o issues, no O2 needs \par \par In office today patient feels well and has no complains at this time. \par \par

## 2021-09-28 NOTE — PLAN
[FreeTextEntry1] : Hospital discharge follow up\par \par Patient was educated on proper hand washing technique and encouraged social distancing in order to reduce the risk of the spread of COVID 19. Patient advised to seek immediate  medical attention if they develop FEVER or SOB. \par \par Advised careful monitoring of temperature, symptoms, especially SOB/Cough. Should limit contact with others, Increase hydration and rest, Tylenol for fever, myalgia, Should seek medical attention if symptoms worsen or if questions arise. \par \par Prior to appointment and during encounter with patient extensive medical records were reviewed including but not limited to, Hospital records records, out patient records, laboratory data and microbiology data \par In addition extensive time was also spent in reviewing diagnostic studies.\par \par Total encounter total time 40 mins\par >50% of time spent counseling/coordinating care\par \par Counseling included abnormal lab results, differential diagnoses, treatment options, risks and benefits, lifestyle changes, current condition, medications, and dose adjustments. \par The patient was interactive, attentive, asked questions, and verbalized understanding

## 2021-10-18 ENCOUNTER — APPOINTMENT (OUTPATIENT)
Dept: INTERNAL MEDICINE | Facility: CLINIC | Age: 31
End: 2021-10-18

## 2021-12-06 NOTE — ED STATDOCS - PMH
Ron Barrera is 4 year old 1 month old, here for a preventive care visit.    Assessment & Plan     (Z00.129) Encounter for routine child health examination w/o abnormal findings  (primary encounter diagnosis)  Comment: Patient growing well. Doing well in . She gets speech therapy which is going well. Mother has no other questions or concerns at this time. Routine anticipatory guidance given.   Plan: BEHAVIORAL/EMOTIONAL ASSESSMENT (68874),         SCREENING TEST, PURE TONE, AIR ONLY, SCREENING,        VISUAL ACUITY, QUANTITATIVE, BILAT, INFLUENZA         VACCINE IM > 6 MONTHS VALENT IIV4         (AFLURIA/FLUZONE), sodium fluoride (VANISH) 5%         white varnish 1 packet, NM APPLICATION TOPICAL         FLUORIDE VARNISH BY [facility]            Growth        Normal height and weight    No weight concerns.    Immunizations     Appropriate vaccinations were ordered.      Anticipatory Guidance    Reviewed age appropriate anticipatory guidance.   Reviewed Anticipatory Guidance in patient instructions  Special attention given to:    Encourage reading    Limit / supervise TV/ media    Calcium and iron sources    Balanced diet    Physical activity    Regular dental care        Referrals/Ongoing Specialty Care  Verbal referral for routine dental care    Follow Up      No follow-ups on file.    Subjective     Additional Questions 12/6/2021   Do you have any questions today that you would like to discuss? No   Has your child had a surgery, major illness or injury since the last physical exam? No     Patient has been advised of split billing requirements and indicates understanding: No    Social 12/6/2021   Who does your child live with? Parent(s), Sibling(s)   Has your child experienced any stressful family events recently? None, (!) RECENT MOVE, (!) CHANGE OF /SCHOOL   In the past 12 months, has lack of transportation kept you from medical appointments or from getting medications? No   In the last 12  months, was there a time when you were not able to pay the mortgage or rent on time? No   In the last 12 months, was there a time when you did not have a steady place to sleep or slept in a shelter (including now)? No       Health Risks/Safety 12/6/2021   What type of car seat does your child use? Car seat with harness   Where does your child sit in the car?  Back seat   Do you have a swimming pool? No   Is your child ever home alone?  No       TB Screening 12/6/2021   Which country?  [place]     TB Screening 12/6/2021   Since your last Well Child visit, have any of your child's family members or close contacts had tuberculosis or a positive tuberculosis test? No   Since your last Well Child Visit, has your child or any of their family members or close contacts traveled or lived outside of the [place]? No   Since your last Well Child visit, has your child lived in a high-risk group setting like a correctional facility, health care facility, homeless shelter, or refugee camp? No        Dyslipidemia Screening 12/6/2021   Have any of the child's parents or grandparents had a stroke or heart attack before age 55 for males or before age 65 for females? No   Do either of the child's parents have high cholesterol or are currently taking medications to treat cholesterol? (!) UNKNOWN    Risk Factors: None    Dental Screening 12/6/2021   Has your child seen a dentist? Yes   When was the last visit? 3 months to 6 months ago   Has your child had cavities in the last 2 years? No   Has your child s parent(s), caregiver, or sibling(s) had any cavities in the last 2 years?  No     Dental Fluoride Varnish:   Yes, fluoride varnish application risks and benefits were discussed, and verbal consent was received.     Diet 12/6/2021   Do you have questions about feeding your child? No   What does your child regularly drink? Water, (!) JUICE   What type of water? Tap   How often does your family eat meals together? Every day   How  many snacks does your child eat per day 2   Are there types of foods your child won't eat? No   Does your child get at least 3 servings of food or beverages that have calcium each day (dairy, green leafy vegetables, etc)? Yes   Within the past 12 months, you worried that your food would run out before you got money to buy more. Never true   Within the past 12 months, the food you bought just didn't last and you didn't have money to get more. Never true     Elimination 12/6/2021   Do you have any concerns about your child's bladder or bowels? No concerns         Activity 12/6/2021   On average, how many days per week does your child engage in moderate to strenuous exercise (like walking fast, running, jogging, dancing, swimming, biking, or other activities that cause a light or heavy sweat)? (!) 5 DAYS   On average, how many minutes does your child engage in exercise at this level? (!) 20 MINUTES   What does your child do for exercise?  Run around   What activities is your child involved with?  Riding bikes     Media Use 12/6/2021   How many hours per day is your child viewing a screen for entertainment?    2 hours   Does your child use a screen in their bedroom? No     Sleep 12/6/2021   Do you have any concerns about your child's sleep?  No concerns, sleeps well through the night       Vision/Hearing 12/6/2021   Do you have any concerns about your child's hearing or vision?  No concerns     Vision Screen  Vision Screen Details  Does the patient have corrective lenses (glasses/contacts)?: No  No Corrective Lenses, PLUS LENS REQUIRED: Pass  Vision Acuity Screen  Vision Acuity Tool: Dorian  RIGHT EYE: 10/16 (20/32)  LEFT EYE: 10/16 (20/32)  Is there a two line difference?: No  Vision Screen Results: Pass    Hearing Screen  RIGHT EAR  1000 Hz on Level 40 dB (Conditioning sound): Pass  1000 Hz on Level 20 dB: Pass  2000 Hz on Level 20 dB: Pass  4000 Hz on Level 20 dB: Pass  LEFT EAR  4000 Hz on Level 20 dB: Pass  2000 Hz  "on Level 20 dB: Pass  1000 Hz on Level 20 dB: Pass  500 Hz on Level 25 dB: Pass  RIGHT EAR  500 Hz on Level 25 dB: Pass  Results  Hearing Screen Results: Pass      School 12/6/2021   Do you have any concerns about your child's learning in school? No concerns   What grade is your child in school?    What school does your child attend? [employer]   Does your child typically miss more than 2 days of school per month? No   Do you have concerns about your child's friendships or peer relationships?  No     Development / Social-Emotional Screen 12/6/2021   Does your child receive any special educational services? (!) SPEECH THERAPY     Mental Health - PSC-17 required for C&TC    Social-Emotional screening:   Electronic PSC   PSC SCORES 12/6/2021   Inattentive / Hyperactive Symptoms Subtotal 3   Externalizing Symptoms Subtotal 1   Internalizing Symptoms Subtotal 0   PSC - 17 Total Score 4       Follow up:  PSC-17 PASS (<15), no follow up necessary     No concerns         Objective     Exam  BP (!) 86/68 (BP Location: Right arm, Patient Position: Sitting, Cuff Size: Child)   Ht 3' 10.75\" (1.187 m)   Wt 51 lb (23.1 kg)   BMI 16.41 kg/m    72 %ile (Z= 0.59) based on CDC (Girls, 2-20 Years) Stature-for-age data based on Stature recorded on 12/6/2021.  77 %ile (Z= 0.73) based on CDC (Girls, 2-20 Years) weight-for-age data using vitals from 12/6/2021.  76 %ile (Z= 0.70) based on CDC (Girls, 2-20 Years) BMI-for-age based on BMI available as of 12/6/2021.  Blood pressure percentiles are 20 % systolic and 89 % diastolic based on the 2017 AAP Clinical Practice Guideline. This reading is in the normal blood pressure range.  Physical Exam  GENERAL: Alert, well appearing, no distress  SKIN: Clear. No significant rash, abnormal pigmentation or lesions  HEAD: Normocephalic.  EYES:  Symmetric light reflex and no eye movement on cover/uncover test. Normal conjunctivae.  EARS: Normal canals. Tympanic membranes are normal; " gray and translucent.  NOSE: Normal without discharge.  MOUTH/THROAT: Clear. No oral lesions. Teeth without obvious abnormalities.  NECK: Supple, no masses.  No thyromegaly.  LYMPH NODES: No adenopathy  LUNGS: Clear. No rales, rhonchi, wheezing or retractions  HEART: Regular rhythm. Normal S1/S2. No murmurs.   ABDOMEN: Soft, non-tender, not distended, no masses or hepatosplenomegaly. Bowel sounds normal.   GENITALIA: Normal female external genitalia. Fidel stage I,  No inguinal herniae are present.  EXTREMITIES: Full range of motion, no deformities  NEUROLOGIC: No focal findings. Cranial nerves grossly intact: DTR's normal. Normal gait, strength and tone      Caitlyn Rodriguez MD  [facility]   No pertinent past medical history

## 2022-01-21 NOTE — ED STATDOCS - ATTENDING CONTRIBUTION TO CARE
Addended by: DICKSON BONNER on: 1/21/2022 01:59 PM     Modules accepted: Orders     seen with acp  c/o back pain after lifting heavy object  PE positive right paraspinal tenderness  x-ray is negative  agree with acps assessment hx and physical

## 2022-03-17 ENCOUNTER — APPOINTMENT (OUTPATIENT)
Dept: INTERNAL MEDICINE | Facility: CLINIC | Age: 32
End: 2022-03-17
Payer: COMMERCIAL

## 2022-03-17 VITALS
SYSTOLIC BLOOD PRESSURE: 142 MMHG | HEIGHT: 74 IN | WEIGHT: 241 LBS | BODY MASS INDEX: 30.93 KG/M2 | DIASTOLIC BLOOD PRESSURE: 84 MMHG | HEART RATE: 82 BPM

## 2022-03-17 DIAGNOSIS — B02.9 ZOSTER W/OUT COMPLICATIONS: ICD-10-CM

## 2022-03-17 PROCEDURE — 99214 OFFICE O/P EST MOD 30 MIN: CPT

## 2022-03-17 NOTE — HEALTH RISK ASSESSMENT
[0] : 2) Feeling down, depressed, or hopeless: Not at all (0) [PHQ-2 Negative - No further assessment needed] : PHQ-2 Negative - No further assessment needed [[id number]] : 0

## 2022-03-17 NOTE — PHYSICAL EXAM
[No Acute Distress] : no acute distress [Well Nourished] : well nourished [Well Developed] : well developed [Well-Appearing] : well-appearing [Normal Sclera/Conjunctiva] : normal sclera/conjunctiva [PERRL] : pupils equal round and reactive to light [EOMI] : extraocular movements intact [Normal Outer Ear/Nose] : the outer ears and nose were normal in appearance [Normal Oropharynx] : the oropharynx was normal [No JVD] : no jugular venous distention [No Lymphadenopathy] : no lymphadenopathy [Supple] : supple [Thyroid Normal, No Nodules] : the thyroid was normal and there were no nodules present [No Respiratory Distress] : no respiratory distress  [No Accessory Muscle Use] : no accessory muscle use [Clear to Auscultation] : lungs were clear to auscultation bilaterally [Normal Rate] : normal rate  [Regular Rhythm] : with a regular rhythm [Normal S1, S2] : normal S1 and S2 [No Murmur] : no murmur heard [No Carotid Bruits] : no carotid bruits [No Abdominal Bruit] : a ~M bruit was not heard ~T in the abdomen [No Varicosities] : no varicosities [Pedal Pulses Present] : the pedal pulses are present [No Edema] : there was no peripheral edema [No Palpable Aorta] : no palpable aorta [No Extremity Clubbing/Cyanosis] : no extremity clubbing/cyanosis [Soft] : abdomen soft [Non Tender] : non-tender [Non-distended] : non-distended [No Masses] : no abdominal mass palpated [No HSM] : no HSM [Normal Bowel Sounds] : normal bowel sounds [Normal Posterior Cervical Nodes] : no posterior cervical lymphadenopathy [Normal Anterior Cervical Nodes] : no anterior cervical lymphadenopathy [No CVA Tenderness] : no CVA  tenderness [No Spinal Tenderness] : no spinal tenderness [No Joint Swelling] : no joint swelling [Grossly Normal Strength/Tone] : grossly normal strength/tone [Coordination Grossly Intact] : coordination grossly intact [No Focal Deficits] : no focal deficits [Normal Gait] : normal gait [Deep Tendon Reflexes (DTR)] : deep tendon reflexes were 2+ and symmetric [Normal Affect] : the affect was normal [Normal Insight/Judgement] : insight and judgment were intact [de-identified] : r [de-identified] : vesicular cluster with erythematous base on dermatomal distribution right side forehead, tender to palpation.

## 2022-03-17 NOTE — PLAN
[FreeTextEntry1] : Shingles rash, patient prescribed Gabapentin, Prednisone and Valtrex.\par \par Patient was referred to Ophthalmologist for further evaluation of eye. \par \par Prior to appointment and during encounter with patient extensive medical records were reviewed including but not limited to, Hospital records records, out patient records, laboratory data and microbiology data \par In addition extensive time was also spent in reviewing diagnostic studies.\par \par Total encounter total time 30 mins\par >50% of time spent counseling/coordinating care\par \par Counseling included abnormal lab results, differential diagnoses, treatment options, risks and benefits, lifestyle changes, current condition, medications, and dose adjustments. \par The patient was interactive, attentive, asked questions, and verbalized understanding

## 2022-03-17 NOTE — HISTORY OF PRESENT ILLNESS
[FreeTextEntry1] : rash [de-identified] : Mr. DAVE NOLAN is a 32 year male with a PMH of HLD comes to the office c/o painful rash on right side of forehead x 2 days. Patient denies fever, cough SOB. No other complaints at this time.

## 2022-03-18 ENCOUNTER — APPOINTMENT (OUTPATIENT)
Dept: OPHTHALMOLOGY | Facility: CLINIC | Age: 32
End: 2022-03-18

## 2023-02-22 ENCOUNTER — APPOINTMENT (OUTPATIENT)
Dept: ORTHOPEDIC SURGERY | Facility: CLINIC | Age: 33
End: 2023-02-22
Payer: COMMERCIAL

## 2023-02-22 VITALS
SYSTOLIC BLOOD PRESSURE: 131 MMHG | HEART RATE: 72 BPM | BODY MASS INDEX: 31.44 KG/M2 | WEIGHT: 245 LBS | DIASTOLIC BLOOD PRESSURE: 91 MMHG | HEIGHT: 74 IN

## 2023-02-22 DIAGNOSIS — M25.511 PAIN IN RIGHT SHOULDER: ICD-10-CM

## 2023-02-22 DIAGNOSIS — M25.512 PAIN IN RIGHT SHOULDER: ICD-10-CM

## 2023-02-22 PROCEDURE — 99203 OFFICE O/P NEW LOW 30 MIN: CPT

## 2023-03-27 ENCOUNTER — APPOINTMENT (OUTPATIENT)
Dept: CARDIOLOGY | Facility: CLINIC | Age: 33
End: 2023-03-27

## 2023-05-17 NOTE — PATIENT PROFILE ADULT - CHOOSE INDICATION TO IMMUNIZE (AN ORDER WILL BE GENERATED WHEN THIS NOTE IS SAVED):
Please call the patient/family and let them know that the CT scan is normal for age and I would not do anything differently at this time based on these results Patient is not pregnant (male or female)

## 2023-05-24 ENCOUNTER — NON-APPOINTMENT (OUTPATIENT)
Age: 33
End: 2023-05-24

## 2023-05-24 ENCOUNTER — APPOINTMENT (OUTPATIENT)
Dept: CARDIOLOGY | Facility: CLINIC | Age: 33
End: 2023-05-24
Payer: COMMERCIAL

## 2023-05-24 VITALS
HEART RATE: 66 BPM | RESPIRATION RATE: 18 BRPM | HEIGHT: 74 IN | BODY MASS INDEX: 34.14 KG/M2 | WEIGHT: 266 LBS | DIASTOLIC BLOOD PRESSURE: 90 MMHG | OXYGEN SATURATION: 96 % | SYSTOLIC BLOOD PRESSURE: 135 MMHG

## 2023-05-24 DIAGNOSIS — Z82.49 FAMILY HISTORY OF ISCHEMIC HEART DISEASE AND OTHER DISEASES OF THE CIRCULATORY SYSTEM: ICD-10-CM

## 2023-05-24 DIAGNOSIS — R53.83 OTHER FATIGUE: ICD-10-CM

## 2023-05-24 PROCEDURE — 99204 OFFICE O/P NEW MOD 45 MIN: CPT

## 2023-05-24 PROCEDURE — 93000 ELECTROCARDIOGRAM COMPLETE: CPT

## 2023-05-24 RX ORDER — ATORVASTATIN CALCIUM 40 MG/1
40 TABLET, FILM COATED ORAL
Qty: 90 | Refills: 3 | Status: DISCONTINUED | COMMUNITY
Start: 2021-01-13 | End: 2023-05-24

## 2023-05-24 RX ORDER — CLOTRIMAZOLE AND BETAMETHASONE DIPROPIONATE 10; .5 MG/G; MG/G
1-0.05 CREAM TOPICAL TWICE DAILY
Qty: 1 | Refills: 2 | Status: DISCONTINUED | COMMUNITY
Start: 2021-05-25 | End: 2023-05-24

## 2023-05-24 RX ORDER — PREDNISONE 20 MG/1
20 TABLET ORAL
Qty: 5 | Refills: 0 | Status: DISCONTINUED | COMMUNITY
Start: 2022-03-17 | End: 2023-05-24

## 2023-05-24 RX ORDER — MELOXICAM 15 MG/1
15 TABLET ORAL
Qty: 21 | Refills: 0 | Status: DISCONTINUED | COMMUNITY
Start: 2023-02-22 | End: 2023-05-24

## 2023-05-24 RX ORDER — GABAPENTIN 100 MG/1
100 CAPSULE ORAL
Qty: 60 | Refills: 0 | Status: DISCONTINUED | COMMUNITY
Start: 2022-03-17 | End: 2023-05-24

## 2023-05-24 RX ORDER — VALACYCLOVIR 1 G/1
1 TABLET, FILM COATED ORAL
Qty: 21 | Refills: 0 | Status: DISCONTINUED | COMMUNITY
Start: 2022-03-17 | End: 2023-05-24

## 2023-05-24 NOTE — DISCUSSION/SUMMARY
[FreeTextEntry1] : Patient is a 34yo M with HLD, family history CAD here for cardiac evaluation. \par -Mild fatigue lately with prior HLD and family history CAD. Father was smoker as well, had MI age 60\par -ECG and exam unremarkable, BP mildly elevated\par \par \par \par 1. Echo/EST to evaluate above symptom in patient with family history and HLD\par 2. CT CAC for risk stratification\par 3. Diet/lifestyle modifications, no meds at this time for HLD / elevated BP\par 4. BW to include CMP, Mg, CBC, TSH\par 5. Call with results, if unremarkable recommend aggressive risk factor modification only at this time. Regular follow up to monitor BP/lipids with PMD  [EKG obtained to assist in diagnosis and management of assessed problem(s)] : EKG obtained to assist in diagnosis and management of assessed problem(s)

## 2023-05-24 NOTE — ASSESSMENT
[FreeTextEntry1] : ECG: SR, no significant ST-T abnormalities and normal intervals \par \par \par  HDL 41  TG 89 (5/2021) \par A1C 5.3 (5/2021)

## 2023-05-24 NOTE — HISTORY OF PRESENT ILLNESS
[FreeTextEntry1] : Patient is a 32yo M with HLD, family history CAD here for cardiac evaluation. Has not exercised much in past 1-2 years and feeling a bit fatigued. Formerly HLD but was able to get under control with diet/exericse and taken off statin. Feels well other than fatigue. No CP/SOB. Patient denies PND/orthopnea/edema/palpitations/syncope/claudication \par \par \par ROS: GI negative, all others negative

## 2023-05-31 ENCOUNTER — OFFICE (OUTPATIENT)
Dept: URBAN - METROPOLITAN AREA CLINIC 109 | Facility: CLINIC | Age: 33
Setting detail: OPHTHALMOLOGY
End: 2023-05-31
Payer: COMMERCIAL

## 2023-05-31 DIAGNOSIS — H52.13: ICD-10-CM

## 2023-05-31 PROCEDURE — SCREF LASIK EVAL: Performed by: OPHTHALMOLOGY

## 2023-05-31 ASSESSMENT — REFRACTION_AUTOREFRACTION
OD_CYLINDER: -2.00
OS_SPHERE: -0.50
OS_CYLINDER: -2.25
OD_SPHERE: -0.50
OD_AXIS: 092
OS_AXIS: 085

## 2023-05-31 ASSESSMENT — REFRACTION_MANIFEST
OD_SPHERE: -0.50
OD_CYLINDER: -1.50
OS_SPHERE: -0.50
OD_AXIS: 90
OS_CYLINDER: -1.00
OS_AXIS: 85

## 2023-05-31 ASSESSMENT — VISUAL ACUITY
OS_BCVA: 20/50+2
OD_BCVA: 20/40

## 2023-05-31 ASSESSMENT — SPHEQUIV_DERIVED
OD_SPHEQUIV: -1.25
OD_SPHEQUIV: -1.5
OS_SPHEQUIV: -1.625
OS_SPHEQUIV: -1

## 2023-05-31 ASSESSMENT — CONFRONTATIONAL VISUAL FIELD TEST (CVF)
OS_FINDINGS: FULL
OD_FINDINGS: FULL

## 2023-05-31 ASSESSMENT — CORNEAL SURGICAL SCARRING: OS_SCARRING: MID PERIPHERAL

## 2023-06-29 ENCOUNTER — APPOINTMENT (OUTPATIENT)
Dept: INTERNAL MEDICINE | Facility: CLINIC | Age: 33
End: 2023-06-29
Payer: COMMERCIAL

## 2023-06-29 VITALS
HEART RATE: 89 BPM | DIASTOLIC BLOOD PRESSURE: 93 MMHG | HEIGHT: 74 IN | SYSTOLIC BLOOD PRESSURE: 139 MMHG | WEIGHT: 266 LBS | BODY MASS INDEX: 34.14 KG/M2

## 2023-06-29 DIAGNOSIS — R79.9 ABNORMAL FINDING OF BLOOD CHEMISTRY, UNSPECIFIED: ICD-10-CM

## 2023-06-29 DIAGNOSIS — E66.9 OBESITY, UNSPECIFIED: ICD-10-CM

## 2023-06-29 DIAGNOSIS — E78.5 HYPERLIPIDEMIA, UNSPECIFIED: ICD-10-CM

## 2023-06-29 DIAGNOSIS — Z00.00 ENCOUNTER FOR GENERAL ADULT MEDICAL EXAMINATION W/OUT ABNORMAL FINDINGS: ICD-10-CM

## 2023-06-29 PROCEDURE — 36415 COLL VENOUS BLD VENIPUNCTURE: CPT

## 2023-06-29 PROCEDURE — 99395 PREV VISIT EST AGE 18-39: CPT | Mod: 25

## 2023-06-29 NOTE — HISTORY OF PRESENT ILLNESS
[FreeTextEntry1] : physical exam.  [de-identified] : Mr. DAVE NOLAN is a 33 year male with a PMH of HLD comes to the office for physical exam. Patient denies fever, cough SOB. No other complaints at this time.

## 2023-06-29 NOTE — HEALTH RISK ASSESSMENT
[Yes] : Yes [Monthly or less (1 pt)] : Monthly or less (1 point) [1 or 2 (0 pts)] : 1 or 2 (0 points) [Never (0 pts)] : Never (0 points) [Audit-CScore] : 1 [HIV test declined] : HIV test declined [Hepatitis C test declined] : Hepatitis C test declined [Never] : Never

## 2023-07-05 LAB
25(OH)D3 SERPL-MCNC: 28.5 NG/ML
ALBUMIN SERPL ELPH-MCNC: 4.9 G/DL
ALP BLD-CCNC: 46 U/L
ALT SERPL-CCNC: 23 U/L
ANION GAP SERPL CALC-SCNC: 12 MMOL/L
AST SERPL-CCNC: 23 U/L
BILIRUB SERPL-MCNC: 0.5 MG/DL
BUN SERPL-MCNC: 14 MG/DL
CALCIUM SERPL-MCNC: 9.6 MG/DL
CHLORIDE SERPL-SCNC: 101 MMOL/L
CHOLEST SERPL-MCNC: 296 MG/DL
CO2 SERPL-SCNC: 26 MMOL/L
CREAT SERPL-MCNC: 1.12 MG/DL
EGFR: 88 ML/MIN/1.73M2
ESTIMATED AVERAGE GLUCOSE: 114 MG/DL
FOLATE SERPL-MCNC: 13.1 NG/ML
GLUCOSE SERPL-MCNC: 92 MG/DL
HBA1C MFR BLD HPLC: 5.6 %
HDLC SERPL-MCNC: 47 MG/DL
IRON SATN MFR SERPL: 37 %
IRON SERPL-MCNC: 110 UG/DL
LDLC SERPL CALC-MCNC: 220 MG/DL
MAGNESIUM SERPL-MCNC: 2.2 MG/DL
NONHDLC SERPL-MCNC: 249 MG/DL
POTASSIUM SERPL-SCNC: 3.9 MMOL/L
PROT SERPL-MCNC: 7.3 G/DL
PSA SERPL-MCNC: 0.66 NG/ML
SODIUM SERPL-SCNC: 140 MMOL/L
TESTOST FREE SERPL-MCNC: 9.1 PG/ML
TESTOST SERPL-MCNC: 231 NG/DL
TIBC SERPL-MCNC: 295 UG/DL
TRIGL SERPL-MCNC: 142 MG/DL
TSH SERPL-ACNC: 0.83 UIU/ML
UIBC SERPL-MCNC: 185 UG/DL
VIT B12 SERPL-MCNC: 734 PG/ML

## 2023-07-05 RX ORDER — TIRZEPATIDE 5 MG/.5ML
5 INJECTION, SOLUTION SUBCUTANEOUS
Qty: 12 | Refills: 3 | Status: ACTIVE | COMMUNITY
Start: 2023-06-29

## 2023-07-05 RX ORDER — ROSUVASTATIN CALCIUM 20 MG/1
20 TABLET, FILM COATED ORAL
Qty: 90 | Refills: 3 | Status: ACTIVE | COMMUNITY
Start: 2023-07-05 | End: 1900-01-01

## 2024-11-04 NOTE — DISCHARGE NOTE PROVIDER - NSDCCPGOAL_GEN_ALL_CORE_FT
Patient of Dr. Mosqueda last seen in the [place] Pain-ADULT-[place]     To get better and follow your care plan as instructed.

## 2025-02-12 ENCOUNTER — APPOINTMENT (OUTPATIENT)
Dept: SURGERY | Facility: CLINIC | Age: 35
End: 2025-02-12